# Patient Record
Sex: FEMALE | Race: BLACK OR AFRICAN AMERICAN | Employment: UNEMPLOYED | URBAN - METROPOLITAN AREA
[De-identification: names, ages, dates, MRNs, and addresses within clinical notes are randomized per-mention and may not be internally consistent; named-entity substitution may affect disease eponyms.]

---

## 2018-03-22 ENCOUNTER — HOSPITAL ENCOUNTER (EMERGENCY)
Age: 63
Discharge: HOME | End: 2018-03-23
Attending: EMERGENCY MEDICINE
Payer: SELF-PAY

## 2018-03-22 DIAGNOSIS — E86.0 DEHYDRATION: Primary | ICD-10-CM

## 2018-03-22 LAB
ALBUMIN SERPL-MCNC: 4.3 G/DL (ref 3.4–5)
ALBUMIN/GLOB SERPL: 0.9 {RATIO} (ref 0.8–1.7)
ALP SERPL-CCNC: 71 U/L (ref 45–117)
ALT SERPL-CCNC: 61 U/L (ref 13–56)
ANION GAP SERPL CALC-SCNC: 8 MMOL/L (ref 3–18)
AST SERPL-CCNC: 59 U/L (ref 15–37)
BASOPHILS # BLD: 0 K/UL (ref 0–0.06)
BASOPHILS NFR BLD: 0 % (ref 0–3)
BILIRUB SERPL-MCNC: 0.6 MG/DL (ref 0.2–1)
BUN SERPL-MCNC: 45 MG/DL (ref 7–18)
BUN/CREAT SERPL: 27 (ref 12–20)
CALCIUM SERPL-MCNC: 9.9 MG/DL (ref 8.5–10.1)
CHLORIDE SERPL-SCNC: 97 MMOL/L (ref 100–108)
CK MB CFR SERPL CALC: 2.6 % (ref 0–4)
CK MB SERPL-MCNC: 4.5 NG/ML (ref 5–25)
CK SERPL-CCNC: 172 U/L (ref 26–192)
CO2 SERPL-SCNC: 30 MMOL/L (ref 21–32)
CREAT SERPL-MCNC: 1.69 MG/DL (ref 0.6–1.3)
DIFFERENTIAL METHOD BLD: ABNORMAL
EOSINOPHIL # BLD: 0 K/UL (ref 0–0.4)
EOSINOPHIL NFR BLD: 0 % (ref 0–5)
ERYTHROCYTE [DISTWIDTH] IN BLOOD BY AUTOMATED COUNT: 12.7 % (ref 11.6–14.5)
GLOBULIN SER CALC-MCNC: 4.9 G/DL (ref 2–4)
GLUCOSE SERPL-MCNC: 74 MG/DL (ref 74–99)
HCT VFR BLD AUTO: 50.4 % (ref 35–45)
HGB BLD-MCNC: 16.7 G/DL (ref 12–16)
LIPASE SERPL-CCNC: 167 U/L (ref 73–393)
LYMPHOCYTES # BLD: 5.7 K/UL (ref 0.8–3.5)
LYMPHOCYTES NFR BLD: 45 % (ref 20–51)
MCH RBC QN AUTO: 31.7 PG (ref 24–34)
MCHC RBC AUTO-ENTMCNC: 33.1 G/DL (ref 31–37)
MCV RBC AUTO: 95.8 FL (ref 74–97)
MONOCYTES # BLD: 0.8 K/UL (ref 0–1)
MONOCYTES NFR BLD: 6 % (ref 2–9)
NEUTS SEG # BLD: 6.1 K/UL (ref 1.8–8)
NEUTS SEG NFR BLD: 49 % (ref 42–75)
PLATELET # BLD AUTO: 249 K/UL (ref 135–420)
PLATELET COMMENTS,PCOM: ABNORMAL
PMV BLD AUTO: 10.3 FL (ref 9.2–11.8)
POTASSIUM SERPL-SCNC: 3.1 MMOL/L (ref 3.5–5.5)
PROT SERPL-MCNC: 9.2 G/DL (ref 6.4–8.2)
RBC # BLD AUTO: 5.26 M/UL (ref 4.2–5.3)
RBC MORPH BLD: ABNORMAL
SODIUM SERPL-SCNC: 135 MMOL/L (ref 136–145)
TROPONIN I SERPL-MCNC: <0.02 NG/ML (ref 0–0.04)
WBC # BLD AUTO: 12.6 K/UL (ref 4.6–13.2)

## 2018-03-22 PROCEDURE — 82550 ASSAY OF CK (CPK): CPT | Performed by: EMERGENCY MEDICINE

## 2018-03-22 PROCEDURE — 85025 COMPLETE CBC W/AUTO DIFF WBC: CPT | Performed by: EMERGENCY MEDICINE

## 2018-03-22 PROCEDURE — 99284 EMERGENCY DEPT VISIT MOD MDM: CPT

## 2018-03-22 PROCEDURE — 84443 ASSAY THYROID STIM HORMONE: CPT | Performed by: EMERGENCY MEDICINE

## 2018-03-22 PROCEDURE — 83690 ASSAY OF LIPASE: CPT | Performed by: EMERGENCY MEDICINE

## 2018-03-22 PROCEDURE — 80053 COMPREHEN METABOLIC PANEL: CPT | Performed by: EMERGENCY MEDICINE

## 2018-03-22 NOTE — LETTER
[address] [place] [address] [facility] EMERGENCY DEPT 
[address] [facility] [address] 
[phone] Work/School Note Date: 3/22/2018 To Whom It May concern: 
 
Vangie Chairez was seen and treated today in the emergency room by the following provider(s): 
Attending Provider: Piero Del Real MD.   
 
Vangie Chairez may return to work on 3/24/18.  
 
Sincerely, 
 
 
 
 
Piero Del Real MD

## 2018-03-23 VITALS
RESPIRATION RATE: 16 BRPM | TEMPERATURE: 96.8 F | HEART RATE: 78 BPM | SYSTOLIC BLOOD PRESSURE: 143 MMHG | DIASTOLIC BLOOD PRESSURE: 51 MMHG | OXYGEN SATURATION: 98 %

## 2018-03-23 LAB — TSH SERPL DL<=0.05 MIU/L-ACNC: 6.32 UIU/ML (ref 0.36–3.74)

## 2018-03-23 PROCEDURE — 74011250636 HC RX REV CODE- 250/636: Performed by: EMERGENCY MEDICINE

## 2018-03-23 PROCEDURE — 96361 HYDRATE IV INFUSION ADD-ON: CPT

## 2018-03-23 PROCEDURE — 96360 HYDRATION IV INFUSION INIT: CPT

## 2018-03-23 RX ADMIN — SODIUM CHLORIDE 1000 ML: 900 INJECTION, SOLUTION INTRAVENOUS at 00:50

## 2018-03-23 NOTE — ED TRIAGE NOTES
Pt states she was vomiting and having diarrhea Monday and Tuesday of this week. States no longer having diarrhea or vomiting by reports now that she is fatigued.

## 2018-03-23 NOTE — ED PROVIDER NOTES
EMERGENCY DEPARTMENT HISTORY AND PHYSICAL EXAM    10:18 PM      Date: 3/22/2018  Patient Name: Chuck Renee    History of Presenting Illness     Chief Complaint   Patient presents with    Fatigue         History Provided By: Patient    Chief Complaint: Fatigue   Duration:  Hours  Timing:  Constant  Location: Constitutional  Severity: N/A  Modifying Factors: No alleviating or exacerbating factors reported  Associated Symptoms: abd pain and n/v/d (now resolved) and lack of appetite       Additional History (Context):     Chuck Renee is a 61 y.o. female with a pertinent history of HTN, presenting to the ED c/o constant fatigue today. Also reports lack of appetite. Pt explains she woke up with n/v/d and abd pain 4 days ago. sxs continued and resolved 2 days ago, now pt states \"I just feel weak. \" Says she had 4 episodes of vomiting and 4 episodes of diarrhea (described as \"runny stools\"). States she has not been tolerating PO solids well but has been able to drink Ginger-Sejal. Notes her grandchild had \"cold\" sxs. Pt denies dysuria, urinary frequency, CP, SOB. No current nausea or abd pain. No other acute symptoms or complaints were noted. PCP: None    Current Outpatient Prescriptions   Medication Sig Dispense Refill    HYDROcodone-acetaminophen (NORCO) 5-325 mg per tablet 1 to 2 tabs every 4-6 hours prn 20 Tab 0    olmesartan-hydrochlorothiazide (BENICAR HCT) 20-12.5 mg per tablet Take 1 Tab by mouth daily. 30 Tab 0    fenofibrate nanocrystallized (TRICOR) 48 mg tablet Take 1 Tab by mouth daily. 30 Tab 0       Past History     Past Medical History:  Past Medical History:   Diagnosis Date    Hypertension        Past Surgical History:  History reviewed. No pertinent surgical history.     Family History:  Family History   Problem Relation Age of Onset    Cancer Mother     Heart Disease Father     Hypertension Sister        Social History:  Social History   Substance Use Topics    Smoking status: Current Every Day Smoker     Packs/day: 0.30     Types: Cigarettes    Smokeless tobacco: Never Used    Alcohol use Yes       Allergies:  No Known Allergies      Review of Systems       Review of Systems   Constitutional: Positive for appetite change and fatigue. Negative for chills and fever. HENT: Negative for rhinorrhea. Respiratory: Negative for shortness of breath. Cardiovascular: Negative for chest pain. Gastrointestinal: Positive for abdominal pain (now resolved), diarrhea (now resolved), nausea (now resolved) and vomiting (now resolved ). Endocrine: Negative for polyuria. Genitourinary: Negative for dysuria and frequency. Musculoskeletal: Negative for back pain. Skin: Negative for rash. Neurological: Negative for headaches. All other systems reviewed and are negative. Physical Exam     Patient Vitals for the past 12 hrs:   Temp Pulse Resp BP SpO2   03/22/18 2200 - - - - 95 %   03/22/18 2050 96.8 °F (36 °C) 78 16 120/69 95 %         Physical Exam   Constitutional: She is oriented to person, place, and time. She appears well-developed and well-nourished. Speaking in full sentences   HENT:   Head: Normocephalic and atraumatic. Eyes: Conjunctivae are normal. Pupils are equal, round, and reactive to light. Neck: Normal range of motion. Neck supple. Cardiovascular: Normal rate and regular rhythm. Pulmonary/Chest: Effort normal and breath sounds normal. No respiratory distress. She has no wheezes. Abdominal: Soft. Bowel sounds are normal. She exhibits no distension. There is no tenderness. There is no rebound and no guarding. Musculoskeletal: Normal range of motion. Neurological: She is alert and oriented to person, place, and time. Skin: Skin is warm and dry. Psychiatric: She has a normal mood and affect. Thought content normal.   Nursing note and vitals reviewed.         Diagnostic Study Results     Labs -  Recent Results (from the past 12 hour(s))   CBC WITH AUTOMATED DIFF Collection Time: 03/22/18 10:28 PM   Result Value Ref Range    WBC 12.6 4.6 - 13.2 K/uL    RBC 5.26 4.20 - 5.30 M/uL    HGB 16.7 (H) 12.0 - 16.0 g/dL    HCT 50.4 (H) 35.0 - 45.0 %    MCV 95.8 74.0 - 97.0 FL    MCH 31.7 24.0 - 34.0 PG    MCHC 33.1 31.0 - 37.0 g/dL    RDW 12.7 11.6 - 14.5 %    PLATELET 307 130 - 773 K/uL    MPV 10.3 9.2 - 11.8 FL    NEUTROPHILS 49 42 - 75 %    LYMPHOCYTES 45 20 - 51 %    MONOCYTES 6 2 - 9 %    EOSINOPHILS 0 0 - 5 %    BASOPHILS 0 0 - 3 %    ABS. NEUTROPHILS 6.1 1.8 - 8.0 K/UL    ABS. LYMPHOCYTES 5.7 (H) 0.8 - 3.5 K/UL    ABS. MONOCYTES 0.8 0 - 1.0 K/UL    ABS. EOSINOPHILS 0.0 0.0 - 0.4 K/UL    ABS. BASOPHILS 0.0 0.0 - 0.06 K/UL    DF MANUAL      PLATELET COMMENTS ADEQUATE PLATELETS      RBC COMMENTS NORMOCYTIC, NORMOCHROMIC     METABOLIC PANEL, COMPREHENSIVE    Collection Time: 03/22/18 10:28 PM   Result Value Ref Range    Sodium 135 (L) 136 - 145 mmol/L    Potassium 3.1 (L) 3.5 - 5.5 mmol/L    Chloride 97 (L) 100 - 108 mmol/L    CO2 30 21 - 32 mmol/L    Anion gap 8 3.0 - 18 mmol/L    Glucose 74 74 - 99 mg/dL    BUN 45 (H) 7.0 - 18 MG/DL    Creatinine 1.69 (H) 0.6 - 1.3 MG/DL    BUN/Creatinine ratio 27 (H) 12 - 20      GFR est AA 37 (L) >60 ml/min/1.73m2    GFR est non-AA 31 (L) >60 ml/min/1.73m2    Calcium 9.9 8.5 - 10.1 MG/DL    Bilirubin, total 0.6 0.2 - 1.0 MG/DL    ALT (SGPT) 61 (H) 13 - 56 U/L    AST (SGOT) 59 (H) 15 - 37 U/L    Alk.  phosphatase 71 45 - 117 U/L    Protein, total 9.2 (H) 6.4 - 8.2 g/dL    Albumin 4.3 3.4 - 5.0 g/dL    Globulin 4.9 (H) 2.0 - 4.0 g/dL    A-G Ratio 0.9 0.8 - 1.7     LIPASE    Collection Time: 03/22/18 10:28 PM   Result Value Ref Range    Lipase 167 73 - 393 U/L   CARDIAC PANEL,(CK, CKMB & TROPONIN)    Collection Time: 03/22/18 10:28 PM   Result Value Ref Range     26 - 192 U/L    CK - MB 4.5 (H) <3.6 ng/ml    CK-MB Index 2.6 0.0 - 4.0 %    Troponin-I, Qt. <0.02 0.0 - 0.045 NG/ML   TSH 3RD GENERATION    Collection Time: 03/22/18 10:28 PM Result Value Ref Range    TSH 6.32 (H) 0.36 - 3.74 uIU/mL       Radiologic Studies -   No results found. Medical Decision Making   I am the first provider for this patient. I reviewed the vital signs, available nursing notes, past medical history, past surgical history, family history and social history. Vital Signs-Reviewed the patient's vital signs. Pulse Oximetry Analysis -  95% on room air (Interpretation)    Records Reviewed: Nursing Notes and Old Medical Records (Time of Review: 10:18 PM)    Provider Notes (Medical Decision Making): Patient with mild dehydration after likely viral illness. Has no abdominal tenderness. Mild AMELIA and dehydration on labs. Says she feels much better after 1L IVF. Says she will return if has any new symptoms. Did not want any zofran for home. ED Course: Progress Notes, Reevaluation, and Consults:    2:04 AM Ambulated the pt. Pt states she feels beter. No current complaints. Diagnosis     Clinical Impression:   1. Dehydration        Disposition: Discharged     Follow-up Information     Follow up With Details Comments Contact Info    [facility] EMERGENCY [facility]PT  If symptoms worsen [address] [place] [id number]  [phone]           Patient's Medications   Start Taking    No medications on file   Continue Taking    FENOFIBRATE NANOCRYSTALLIZED (TRICOR) 48 MG TABLET    Take 1 Tab by mouth daily. HYDROCODONE-ACETAMINOPHEN (NORCO) 5-325 MG PER TABLET    1 to 2 tabs every 4-6 hours prn    OLMESARTAN-HYDROCHLOROTHIAZIDE (BENICAR HCT) 20-12.5 MG PER TABLET    Take 1 Tab by mouth daily.    These Medications have changed    No medications on file   Stop Taking    No medications on file     _______________________________    Attestations:  Scribe Attestation     Joya Fitzpatrick acting as a scribe for and in the presence of Slava Avina MD     March 22, 2018 at 10:18 PM       Provider Attestation:      I personally performed the services described in the documentation, reviewed the documentation, as recorded by the scribe in my presence, and it accurately and completely records my words and actions.  March 22, 2018 at 10:18 PM - Jena Ojeda MD    _______________________________

## 2018-03-23 NOTE — DISCHARGE INSTRUCTIONS
Dehydration: Care Instructions  Your Care Instructions  Dehydration happens when your body loses too much fluid. This might happen when you do not drink enough water or you lose large amounts of fluids from your body because of diarrhea, vomiting, or sweating. Severe dehydration can be life-threatening. Water and minerals called electrolytes help put your body fluids back in balance. Learn the early signs of fluid loss, and drink more fluids to prevent dehydration. Follow-up care is a key part of your treatment and safety. Be sure to make and go to all appointments, and call your doctor if you are having problems. It's also a good idea to know your test results and keep a list of the medicines you take. How can you care for yourself at home? · To prevent dehydration, drink plenty of fluids, enough so that your urine is light yellow or clear like water. Choose water and other caffeine-free clear liquids until you feel better. If you have kidney, heart, or liver disease and have to limit fluids, talk with your doctor before you increase the amount of fluids you drink. · If you do not feel like eating or drinking, try taking small sips of water, sports drinks, or other rehydration drinks. · Get plenty of rest.  To prevent dehydration  · Add more fluids to your diet and daily routine, unless your doctor has told you not to. · During hot weather, drink more fluids. Drink even more fluids if you exercise a lot. Stay away from drinks with alcohol or caffeine. · Watch for the symptoms of dehydration. These include:  ¨ A dry, sticky mouth. ¨ Dark yellow urine, and not much of it. ¨ Dry and sunken eyes. ¨ Feeling very tired. · Learn what problems can lead to dehydration. These include:  ¨ Diarrhea, fever, and vomiting. ¨ Any illness with a fever, such as pneumonia or the flu. ¨ Activities that cause heavy sweating, such as endurance races and heavy outdoor work in hot or humid weather.   ¨ Alcohol or drug abuse or withdrawal.  ¨ Certain medicines, such as cold and allergy pills (antihistamines), diet pills (diuretics), and laxatives. ¨ Certain diseases, such as diabetes, cancer, and heart or kidney disease. When should you call for help? Call 911 anytime you think you may need emergency care. For example, call if:  ? · You passed out (lost consciousness). ?Call your doctor now or seek immediate medical care if:  ? · You are confused and cannot think clearly. ? · You are dizzy or lightheaded, or you feel like you may faint. ? · You have signs of needing more fluids. You have sunken eyes and a dry mouth, and you pass only a little dark urine. ? · You cannot keep fluids down. ? Watch closely for changes in your health, and be sure to contact your doctor if:  ? · You are not making tears. ? · Your skin is very dry and sags slowly back into place after you pinch it. ? · Your mouth and eyes are very dry. Where can you learn more? Go to http://david-eyad.info/. Enter I996 in the search box to learn more about \"Dehydration: Care Instructions. \"  Current as of: March 20, 2017  Content Version: 11.4  © 9359-5006 [employer]. Care instructions adapted under license by [employer] (which disclaims liability or warranty for this information). If you have questions about a medical condition or this instruction, always ask your healthcare professional. [place] [address] any warranty or liability for your use of this information.

## 2020-11-26 ENCOUNTER — APPOINTMENT (OUTPATIENT)
Dept: GENERAL RADIOLOGY | Age: 65
End: 2020-11-26
Attending: EMERGENCY MEDICINE
Payer: MEDICARE

## 2020-11-26 ENCOUNTER — HOSPITAL ENCOUNTER (EMERGENCY)
Age: 65
Discharge: HOME | End: 2020-11-26
Attending: EMERGENCY MEDICINE
Payer: MEDICARE

## 2020-11-26 VITALS
SYSTOLIC BLOOD PRESSURE: 113 MMHG | HEART RATE: 83 BPM | RESPIRATION RATE: 18 BRPM | HEIGHT: 60 IN | DIASTOLIC BLOOD PRESSURE: 80 MMHG | OXYGEN SATURATION: 99 % | BODY MASS INDEX: 22.97 KG/M2 | WEIGHT: 117 LBS | TEMPERATURE: 98.1 F

## 2020-11-26 DIAGNOSIS — I10 HYPERTENSION, UNSPECIFIED TYPE: ICD-10-CM

## 2020-11-26 DIAGNOSIS — R11.2 NAUSEA AND VOMITING, INTRACTABILITY OF VOMITING NOT SPECIFIED, UNSPECIFIED VOMITING TYPE: Primary | ICD-10-CM

## 2020-11-26 DIAGNOSIS — N39.0 URINARY TRACT INFECTION WITHOUT HEMATURIA, SITE UNSPECIFIED: ICD-10-CM

## 2020-11-26 DIAGNOSIS — E87.6 HYPOKALEMIA: ICD-10-CM

## 2020-11-26 LAB
ANION GAP SERPL CALC-SCNC: 11 MMOL/L (ref 3–18)
APPEARANCE UR: ABNORMAL
BACTERIA URNS QL MICRO: ABNORMAL /HPF
BASOPHILS # BLD: 0 K/UL (ref 0–0.06)
BASOPHILS NFR BLD: 0 % (ref 0–3)
BILIRUB UR QL: ABNORMAL
BUN SERPL-MCNC: 21 MG/DL (ref 7–18)
BUN/CREAT SERPL: 19 (ref 12–20)
CALCIUM SERPL-MCNC: 11.3 MG/DL (ref 8.5–10.1)
CHLORIDE SERPL-SCNC: 98 MMOL/L (ref 100–111)
CO2 SERPL-SCNC: 29 MMOL/L (ref 21–32)
COLOR UR: ABNORMAL
CREAT SERPL-MCNC: 1.12 MG/DL (ref 0.6–1.3)
DIFFERENTIAL METHOD BLD: ABNORMAL
EOSINOPHIL # BLD: 0 K/UL (ref 0–0.4)
EOSINOPHIL NFR BLD: 0 % (ref 0–5)
EPITH CASTS URNS QL MICRO: ABNORMAL /LPF (ref 0–5)
ERYTHROCYTE [DISTWIDTH] IN BLOOD BY AUTOMATED COUNT: 12.7 % (ref 11.6–14.5)
GLUCOSE SERPL-MCNC: 117 MG/DL (ref 74–99)
GLUCOSE UR STRIP.AUTO-MCNC: NEGATIVE MG/DL
HCT VFR BLD AUTO: 53.7 % (ref 35–45)
HGB BLD-MCNC: 18 G/DL (ref 12–16)
HGB UR QL STRIP: ABNORMAL
KETONES UR QL STRIP.AUTO: 15 MG/DL
LEUKOCYTE ESTERASE UR QL STRIP.AUTO: ABNORMAL
LYMPHOCYTES # BLD: 2.4 K/UL (ref 0.8–3.5)
LYMPHOCYTES NFR BLD: 16 % (ref 20–51)
MCH RBC QN AUTO: 31.9 PG (ref 24–34)
MCHC RBC AUTO-ENTMCNC: 33.5 G/DL (ref 31–37)
MCV RBC AUTO: 95.2 FL (ref 74–97)
MONOCYTES # BLD: 1.1 K/UL (ref 0–1)
MONOCYTES NFR BLD: 7 % (ref 2–9)
MUCOUS THREADS URNS QL MICRO: ABNORMAL /LPF
NEUTS SEG # BLD: 11.5 K/UL (ref 1.8–8)
NEUTS SEG NFR BLD: 77 % (ref 42–75)
NITRITE UR QL STRIP.AUTO: NEGATIVE
PH UR STRIP: 5.5 [PH] (ref 5–8)
PLATELET # BLD AUTO: 311 K/UL (ref 135–420)
PLATELET COMMENTS,PCOM: ABNORMAL
PMV BLD AUTO: 10.2 FL (ref 9.2–11.8)
POTASSIUM SERPL-SCNC: 3.2 MMOL/L (ref 3.5–5.5)
PROT UR STRIP-MCNC: 300 MG/DL
RBC # BLD AUTO: 5.64 M/UL (ref 4.2–5.3)
RBC #/AREA URNS HPF: ABNORMAL /HPF (ref 0–5)
RBC MORPH BLD: ABNORMAL
SODIUM SERPL-SCNC: 138 MMOL/L (ref 136–145)
SP GR UR REFRACTOMETRY: >1.03 (ref 1–1.03)
UROBILINOGEN UR QL STRIP.AUTO: 1 EU/DL (ref 0.2–1)
WBC # BLD AUTO: 15 K/UL (ref 4.6–13.2)
WBC URNS QL MICRO: ABNORMAL /HPF (ref 0–4)

## 2020-11-26 PROCEDURE — 99285 EMERGENCY DEPT VISIT HI MDM: CPT

## 2020-11-26 PROCEDURE — 80048 BASIC METABOLIC PNL TOTAL CA: CPT

## 2020-11-26 PROCEDURE — 85025 COMPLETE CBC W/AUTO DIFF WBC: CPT

## 2020-11-26 PROCEDURE — 81001 URINALYSIS AUTO W/SCOPE: CPT

## 2020-11-26 PROCEDURE — 71045 X-RAY EXAM CHEST 1 VIEW: CPT

## 2020-11-26 PROCEDURE — 93005 ELECTROCARDIOGRAM TRACING: CPT

## 2020-11-26 PROCEDURE — 74011250636 HC RX REV CODE- 250/636: Performed by: EMERGENCY MEDICINE

## 2020-11-26 PROCEDURE — 74011250637 HC RX REV CODE- 250/637: Performed by: EMERGENCY MEDICINE

## 2020-11-26 PROCEDURE — 96374 THER/PROPH/DIAG INJ IV PUSH: CPT

## 2020-11-26 PROCEDURE — 87635 SARS-COV-2 COVID-19 AMP PRB: CPT

## 2020-11-26 PROCEDURE — 87086 URINE CULTURE/COLONY COUNT: CPT

## 2020-11-26 PROCEDURE — 96375 TX/PRO/DX INJ NEW DRUG ADDON: CPT

## 2020-11-26 RX ORDER — LABETALOL HCL 20 MG/4 ML
20 SYRINGE (ML) INTRAVENOUS ONCE
Status: COMPLETED | OUTPATIENT
Start: 2020-11-26 | End: 2020-11-26

## 2020-11-26 RX ORDER — CEFDINIR 300 MG/1
300 CAPSULE ORAL 2 TIMES DAILY
Qty: 20 CAP | Refills: 0 | Status: SHIPPED | OUTPATIENT
Start: 2020-11-26 | End: 2020-12-06

## 2020-11-26 RX ORDER — ONDANSETRON 4 MG/1
4 TABLET, FILM COATED ORAL
Qty: 12 TAB | Refills: 0 | Status: SHIPPED | OUTPATIENT
Start: 2020-11-26 | End: 2021-07-16 | Stop reason: SDUPTHER

## 2020-11-26 RX ORDER — AMLODIPINE BESYLATE 5 MG/1
5 TABLET ORAL DAILY
Qty: 20 TAB | Refills: 0 | Status: SHIPPED | OUTPATIENT
Start: 2020-11-26 | End: 2020-12-16

## 2020-11-26 RX ORDER — ONDANSETRON 2 MG/ML
4 INJECTION INTRAMUSCULAR; INTRAVENOUS
Status: COMPLETED | OUTPATIENT
Start: 2020-11-26 | End: 2020-11-26

## 2020-11-26 RX ADMIN — POTASSIUM BICARBONATE 20 MEQ: 782 TABLET, EFFERVESCENT ORAL at 19:36

## 2020-11-26 RX ADMIN — LABETALOL HYDROCHLORIDE 20 MG: 5 INJECTION, SOLUTION INTRAVENOUS at 17:09

## 2020-11-26 RX ADMIN — ONDANSETRON 4 MG: 2 INJECTION INTRAMUSCULAR; INTRAVENOUS at 17:09

## 2020-11-26 NOTE — ED TRIAGE NOTES
Patient advised by [facility] to report to ER for further evaluation related to dx of Hypertensive Emergency. Patient c/o fatigue, vomiting, loss of appetite and nausea x 4 days.

## 2020-11-26 NOTE — ED TRIAGE NOTES
Patient states that she has not used BP medications in approximately one year. She states last smoking cigarettes approximately 8 months ago. States last using heroin approximately 6 months ago.

## 2020-11-27 ENCOUNTER — PATIENT OUTREACH (OUTPATIENT)
Dept: CASE MANAGEMENT | Age: 65
End: 2020-11-27

## 2020-11-27 LAB
ATRIAL RATE: 104 BPM
CALCULATED P AXIS, ECG09: 81 DEGREES
CALCULATED R AXIS, ECG10: 38 DEGREES
CALCULATED T AXIS, ECG11: 78 DEGREES
DIAGNOSIS, 93000: NORMAL
P-R INTERVAL, ECG05: 136 MS
Q-T INTERVAL, ECG07: 376 MS
QRS DURATION, ECG06: 74 MS
QTC CALCULATION (BEZET), ECG08: 494 MS
VENTRICULAR RATE, ECG03: 104 BPM

## 2020-11-27 NOTE — ED PROVIDER NOTES
EMERGENCY DEPARTMENT HISTORY AND PHYSICAL EXAM    7:30 PM  Date: 2020  Patient Name: Iron Miller    History of Presenting Illness     Chief Complaint   Patient presents with    Hypertension    Anorexia    Fatigue    Nausea        History Provided By: patient     HPI: Iron Miller is a 72 y.o. female with past medical history of hypertension presents with cold symptoms for 4 days, patient had some episodes of vomiting, and feels queasy. Patient denies any abdominal pain. Patient went to an  urgent care and her blood pressure was elevated sent here. Patient denies any chest pain, shortness of breath, fever or chills. Has not been taking antihypertensive medicines for a year. Last time she used heroin was 6 months ago. PCP: None    Past History     Past Medical History:  Past Medical History:   Diagnosis Date    Heroin abuse ([facility] [address]     Hypertension        Past Surgical History:  History reviewed. No pertinent surgical history. Family History:  Family History   Problem Relation Age of Onset    Cancer Mother     Heart Disease Father     Hypertension Sister        Social History:  Social History     Tobacco Use    Smoking status: Former Smoker     Packs/day: 0.30     Types: Cigarettes     Last attempt to quit: 3/26/2020     Years since quittin.6    Smokeless tobacco: Never Used   Substance Use Topics    Alcohol use: Yes     Comment: occasional    Drug use: Not Currently     Types: Heroin     Comment: taking methadone stop Heroin about 8 years ago       Allergies:  No Known Allergies    Review of Systems   Review of Systems   Constitutional: Negative for activity change, appetite change and chills. HENT: Negative for congestion, ear discharge, ear pain and sore throat. Eyes: Negative for photophobia and pain. Respiratory: Negative for cough and choking. Cardiovascular: Negative for palpitations and leg swelling. Gastrointestinal: Positive for nausea and vomiting.  Negative for anal bleeding and rectal pain. Endocrine: Negative for polydipsia and polyuria. Genitourinary: Negative for genital sores and urgency. Musculoskeletal: Negative for arthralgias and myalgias. Neurological: Negative for dizziness, seizures and speech difficulty. Psychiatric/Behavioral: Negative for hallucinations, self-injury and suicidal ideas. Physical Exam     Patient Vitals for the past 12 hrs:   Temp Pulse Resp BP SpO2   11/26/20 1922 98.1 °F (36.7 °C) 83 18 113/80 99 %   11/26/20 1830    (!) 177/65 97 %   11/26/20 1815    (!) 167/95 95 %   11/26/20 1800    (!) 201/93 98 %   11/26/20 1745    (!) 177/85 97 %   11/26/20 1730    (!) 165/88 94 %   11/26/20 1715    (!) 207/88 96 %   11/26/20 1706    (!) 210/101 95 %   11/26/20 1621 98 °F (36.7 °C) (!) 112 20 (!) 210/116 94 %       Physical Exam  Vitals signs and nursing note reviewed. Constitutional:       Appearance: She is well-developed. HENT:      Head: Normocephalic and atraumatic. Eyes:      General:         Right eye: No discharge. Left eye: No discharge. Neck:      Musculoskeletal: Normal range of motion and neck supple. Cardiovascular:      Rate and Rhythm: Normal rate and regular rhythm. Heart sounds: Normal heart sounds. No murmur. Pulmonary:      Effort: Pulmonary effort is normal. No respiratory distress. Breath sounds: Normal breath sounds. No stridor. No wheezing or rales. Chest:      Chest wall: No tenderness. Abdominal:      General: Bowel sounds are normal. There is no distension. Palpations: Abdomen is soft. Tenderness: There is no abdominal tenderness. There is no guarding or rebound. Musculoskeletal: Normal range of motion. Skin:     General: Skin is warm and dry. Neurological:      Mental Status: She is alert and oriented to person, place, and time.          Diagnostic Study Results     Labs -  Recent Results (from the past 12 hour(s))   EKG, 12 LEAD, INITIAL    Collection Time: 11/26/20  5:00 PM   Result Value Ref Range    Ventricular Rate 104 BPM    Atrial Rate 104 BPM    P-R Interval 136 ms    QRS Duration 74 ms    Q-T Interval 376 ms    QTC Calculation (Bezet) 494 ms    Calculated P Axis 81 degrees    Calculated R Axis 38 degrees    Calculated T Axis 78 degrees    Diagnosis       Sinus tachycardia  Right atrial enlargement  Moderate voltage criteria for LVH, may be normal variant  Borderline ECG  When compared with ECG of 11-AUG-2013 15:58,  Nonspecific T wave abnormality now evident in Lateral leads     CBC WITH AUTOMATED DIFF    Collection Time: 11/26/20  5:21 PM   Result Value Ref Range    WBC 15.0 (H) 4.6 - 13.2 K/uL    RBC 5.64 (H) 4.20 - 5.30 M/uL    HGB 18.0 (H) 12.0 - 16.0 g/dL    HCT 53.7 (H) 35.0 - 45.0 %    MCV 95.2 74.0 - 97.0 FL    MCH 31.9 24.0 - 34.0 PG    MCHC 33.5 31.0 - 37.0 g/dL    RDW 12.7 11.6 - 14.5 %    PLATELET 273 368 - 369 K/uL    MPV 10.2 9.2 - 11.8 FL    NEUTROPHILS 77 (H) 42 - 75 %    LYMPHOCYTES 16 (L) 20 - 51 %    MONOCYTES 7 2 - 9 %    EOSINOPHILS 0 0 - 5 %    BASOPHILS 0 0 - 3 %    ABS. NEUTROPHILS 11.5 (H) 1.8 - 8.0 K/UL    ABS. LYMPHOCYTES 2.4 0.8 - 3.5 K/UL    ABS. MONOCYTES 1.1 (H) 0 - 1.0 K/UL    ABS. EOSINOPHILS 0.0 0.0 - 0.4 K/UL    ABS.  BASOPHILS 0.0 0.0 - 0.06 K/UL    DF MANUAL      PLATELET COMMENTS ADEQUATE PLATELETS      RBC COMMENTS NORMOCYTIC, NORMOCHROMIC     METABOLIC PANEL, BASIC    Collection Time: 11/26/20  5:21 PM   Result Value Ref Range    Sodium 138 136 - 145 mmol/L    Potassium 3.2 (L) 3.5 - 5.5 mmol/L    Chloride 98 (L) 100 - 111 mmol/L    CO2 29 21 - 32 mmol/L    Anion gap 11 3.0 - 18 mmol/L    Glucose 117 (H) 74 - 99 mg/dL    BUN 21 (H) 7.0 - 18 MG/DL    Creatinine 1.12 0.6 - 1.3 MG/DL    BUN/Creatinine ratio 19 12 - 20      GFR est AA 59 (L) >60 ml/min/1.73m2    GFR est non-AA 49 (L) >60 ml/min/1.73m2    Calcium 11.3 (H) 8.5 - 10.1 MG/DL   URINALYSIS W/ RFLX MICROSCOPIC    Collection Time: 11/26/20 7:23 PM   Result Value Ref Range    Color DARK YELLOW      Appearance CLOUDY      Specific gravity >1.030 (H) 1.005 - 1.030    pH (UA) 5.5 5.0 - 8.0      Protein 300 (A) NEG mg/dL    Glucose Negative NEG mg/dL    Ketone 15 (A) NEG mg/dL    Bilirubin LARGE (A) NEG      Blood MODERATE (A) NEG      Urobilinogen 1.0 0.2 - 1.0 EU/dL    Nitrites Negative NEG      Leukocyte Esterase SMALL (A) NEG     URINE MICROSCOPIC ONLY    Collection Time: 11/26/20  7:23 PM   Result Value Ref Range    WBC 4 to 10 0 - 4 /hpf    RBC 11 to 20 0 - 5 /hpf    Epithelial cells 2+ 0 - 5 /lpf    Bacteria 2+ (A) NEG /hpf    Mucus 2+ (A) NEG /lpf       Radiologic Studies -   Xr Chest Port    Result Date: 11/26/2020  IMPRESSION: No radiographic finding for an acute cardiopulmonary process. Medical Decision Making     ED Course: Progress Notes, Reevaluation, and Consults:    7:30 PM Initial assessment performed. The patients presenting problems have been discussed, and they/their family are in agreement with the care plan formulated and outlined with them. I have encouraged them to ask questions as they arise throughout their visit. Provider Notes (Medical Decision Making):   Patient presents with elevated blood pressure from urgent care  Systolic blood pressure in the 220s  Patient given labetalol  No ischemic changes on EKG  Patient has had cold-like symptoms Patient will be tested for COVID-19  Labs as reviewed by me hypokalemia-potassium will be repleted  Patient diagnosed with a UTI  Abdomen soft nontender  Patient has been noncompliant with her antihypertensive medication. Given prescription amlodipine. Advised follow-up with PMD for better blood pressure control  Advised to self quarantine till results are back        Vital Signs-Reviewed the patient's vital signs. Reviewed pt's pulse ox reading. EKG:   Interpreted by me   Rate: 104   Rhythm: NSR   Interpretation: no ST changes  Normal intervals    Records Reviewed:  old medical records (Time of Review: 7:30 PM)  -I am the first provider for this patient.  -I reviewed the vital signs, available nursing notes, past medical history, past surgical history, family history and social history. Clinical Impression     Clinical Impression:     Disposition: discharge      DISCHARGE NOTE:   Pt has been reexamined. Patient has no new complaints, changes, or physical findings. Care plan outlined and precautions discussed. Results were reviewed with the patient. All medications were reviewed with the patient; will d/c home with PMD f/u. All of pt's questions and concerns were addressed. Patient was instructed and agrees to follow up with PMD, as well as to return to the ED upon further deterioration. Patient is ready to go home. This note was dictated utilizing voice recognition software which may lead to typographical errors. I apologize in advance if the situation occurs. If questions arise please do not hesitate to contact me or call our department. This note was dictated utilizing voice recognition software which may lead to typographical errors. I apologize in advance if the situation occurs. If questions arise please do not hesitate to contact me or call our department.     Karley Santacruz MD  7:30 PM

## 2020-11-27 NOTE — PROGRESS NOTES
Date/Time:  11/27/2020 9:04 AM   Call within 2 business days of discharge: Yes   Attempted to reach patient by telephone. Left HIPPA compliant message requesting a return call. Will attempt to reach patient again. Covid test pending.

## 2020-11-27 NOTE — ED NOTES
I have reviewed discharge instructions with the patient. The patient verbalized understanding. Patient armband removed and given to patient to take home. Patient was informed of the privacy risks if armband lost or stolen  Current Discharge Medication List      START taking these medications    Details   amLODIPine (NORVASC) 5 mg tablet Take 1 Tab by mouth daily for 20 days. Qty: 20 Tab, Refills: 0      ondansetron hcl (Zofran) 4 mg tablet Take 1 Tab by mouth every eight (8) hours as needed for Nausea. Qty: 12 Tab, Refills: 0      cefdinir (OMNICEF) 300 mg capsule Take 1 Cap by mouth two (2) times a day for 10 days.   Qty: 20 Cap, Refills: 0

## 2020-11-27 NOTE — DISCHARGE INSTRUCTIONS
[facility] Guidance for Preventing the Spread of Coronavirus Disease 2019 (COVID-19) in Homes and Residential Communities  Prevention steps for:  People with confirmed or suspected COVID-19 (including persons under investigation) who do not need to be hospitalized  and  People with confirmed COVID-19 who were hospitalized and determined to be medically stable to go home  Your healthcare provider and public health staff will evaluate whether you can be cared for at home. If it is determined that you do not need to be hospitalized and can be isolated at home, you will be monitored by staff from your local or state health department. You should follow the prevention steps below until a healthcare provider or local or state health department says you can return to your normal activities. Stay home except to get medical care  You should restrict activities outside your home, except for getting medical care. Do not go to work, school, or public areas. Avoid using public transportation, ride-sharing, or taxis. Separate yourself from other people and animals in your home  People: As much as possible, you should stay in a specific room and away from other people in your home. Also, you should use a separate bathroom, if available. Animals: You should restrict contact with pets and other animals while you are sick with COVID-19, just like you would around other people. Although there have not been reports of pets or other animals becoming sick with COVID-19, it is still recommended that people sick with COVID-19 limit contact with animals until more information is known about the virus. When possible, have another member of your household care for your animals while you are sick. If you are sick with COVID-19, avoid contact with your pet, including petting, snuggling, being kissed or licked, and sharing food.  If you must care for your pet or be around animals while you are sick, wash your hands before and after you interact with pets and wear a facemask. Call ahead before visiting your doctor  If you have a medical appointment, call the healthcare provider and tell them that you have or may have COVID-19. This will help the healthcare provider's office take steps to keep other people from getting infected or exposed. Wear a facemask  You should wear a facemask when you are around other people (e.g., sharing a room or vehicle) or pets and before you enter a healthcare provider's office. If you are not able to wear a facemask (for example, because it causes trouble breathing), then people who live with you should not stay in the same room with you, or they should wear a facemask if they enter your room. Cover your coughs and sneezes  Cover your mouth and nose with a tissue when you cough or sneeze. Throw used tissues in a lined trash can; immediately wash your hands with soap and water for at least 20 seconds or clean your hands with an alcohol-based hand  that contains 60 to 95% alcohol, covering all surfaces of your hands and rubbing them together until they feel dry. Soap and water should be used preferentially if hands are visibly dirty. Clean your hands often  Wash your hands often with soap and water for at least 20 seconds or clean your hands with an alcohol-based hand  that contains 60 to 95% alcohol, covering all surfaces of your hands and rubbing them together until they feel dry. Soap and water should be used preferentially if hands are visibly dirty. Avoid touching your eyes, nose, and mouth with unwashed hands. Avoid sharing personal household items  You should not share dishes, drinking glasses, cups, eating utensils, towels, or bedding with other people or pets in your home. After using these items, they should be washed thoroughly with soap and water.   Clean all high-touch surfaces everyday  High touch surfaces include counters, tabletops, doorknobs, bathroom fixtures, toilets, phones, keyboards, tablets, and bedside tables. Also, clean any surfaces that may have blood, stool, or body fluids on them. Use a household cleaning spray or wipe, according to the label instructions. Labels contain instructions for safe and effective use of the cleaning product including precautions you should take when applying the product, such as wearing gloves and making sure you have good ventilation during use of the product. Monitor your symptoms  Seek prompt medical attention if your illness is worsening (e.g., difficulty breathing). Before seeking care, call your healthcare provider and tell them that you have, or are being evaluated for, COVID-19. Put on a facemask before you enter the facility. These steps will help the healthcare provider's office to keep other people in the office or waiting room from getting infected or exposed. Ask your healthcare provider to call the local or state health department. Persons who are placed under active monitoring or facilitated self-monitoring should follow instructions provided by their local health department or occupational health professionals, as appropriate. If you have a medical emergency and need to call 911, notify the dispatch personnel that you have, or are being evaluated for COVID-19. If possible, put on a facemask before emergency medical services arrive. Discontinuing home isolation  Patients with confirmed COVID-19 should remain under home isolation precautions until the risk of secondary transmission to others is thought to be low. The decision to discontinue home isolation precautions should be made on a case-by-case basis, in consultation with healthcare providers and state and local health departments.   Recommended precautions for household members, intimate partners, and caregivers in a nonhealthcare setting of  A patient with symptomatic laboratory-confirmed COVID-19  or  A patient under investigation  Household members, intimate partners, and caregivers in a nonhealthcare setting may have close contact2 with a person with symptomatic, laboratory-confirmed COVID-19 or a person under investigation. Close contacts should monitor their health; they should call their healthcare provider right away if they develop symptoms suggestive of COVID-19 (e.g., fever, cough, shortness of breath)   Close contacts should also follow these recommendations:  Make sure that you understand and can help the patient follow their healthcare provider's instructions for medication(s) and care. You should help the patient with basic needs in the home and provide support for getting groceries, prescriptions, and other personal needs. Monitor the patient's symptoms. If the patient is getting sicker, call his or her healthcare provider and tell them that the patient has laboratory-confirmed COVID-19. This will help the healthcare provider's office take steps to keep other people in the office or waiting room from getting infected. Ask the healthcare provider to call the local or [facility] health department for additional guidance. If the patient has a medical emergency and you need to call 911, notify the dispatch personnel that the patient has, or is being evaluated for COVID-19. Household members should stay in another room or be  from the patient as much as possible. Household members should use a separate bedroom and bathroom, if available. Prohibit visitors who do not have an essential need to be in the home. Household members should care for any pets in the home. Do not handle pets or other animals while sick. Make sure that shared spaces in the home have good air flow, such as by an air conditioner or an opened window, weather permitting. Perform hand hygiene frequently.  Wash your hands often with soap and water for at least 20 seconds or use an alcohol-based hand  that contains 60 to 95% alcohol, covering all surfaces of your hands and rubbing them together until they feel dry. Soap and water should be used preferentially if hands are visibly dirty. Avoid touching your eyes, nose, and mouth with unwashed hands. You and the patient should wear a facemask if you are in the same room. Wear a disposable facemask and gloves when you touch or have contact with the patient's blood, stool, or body fluids, such as saliva, sputum, nasal mucus, vomit, urine. Throw out disposable facemasks and gloves after using them. Do not reuse. When removing personal protective equipment, first remove and dispose of gloves. Then, immediately clean your hands with soap and water or alcohol-based hand . Next, remove and dispose of facemask, and immediately clean your hands again with soap and water or alcohol-based hand . Avoid sharing household items with the patient. You should not share dishes, drinking glasses, cups, eating utensils, towels, bedding, or other items. After the patient uses these items, you should wash them thoroughly (see below [employer]). Clean all high-touch surfaces, such as counters, tabletops, doorknobs, bathroom fixtures, toilets, phones, keyboards, tablets, and bedside tables, every day. Also, clean any surfaces that may have blood, stool, or body fluids on them. Use a household cleaning spray or wipe, according to the label instructions. Labels contain instructions for safe and effective use of the cleaning product including precautions you should take when applying the product, such as wearing gloves and making sure you have good ventilation during use of the product. [address] thoroughly. Immediately remove and wash clothes or bedding that have blood, stool, or body fluids on them. Wear disposable gloves while handling soiled items and keep soiled items away from your body. Clean your hands (with soap and water or an alcohol-based hand ) immediately after removing your gloves.   Read and follow directions on labels of laundry or clothing items and detergent. In general, using a normal laundry detergent according to washing machine instructions and dry thoroughly using the warmest temperatures recommended on the clothing label. Place all used disposable gloves, facemasks, and other contaminated items in a lined container before disposing of them with other household waste. Clean your hands (with soap and water or an alcohol-based hand ) immediately after handling these items. Soap and water should be used preferentially if hands are visibly dirty. Discuss any additional questions with your state or local health department or healthcare provider. Footnotes  2Close contact is defined as--  a) being within approximately 6 feet (2 meters) of a COVID-19 case for a prolonged period of time; close contact can occur while caring for, living with, visiting, or sharing a health care waiting area or room with a COVID-19 case  - or -  b) having direct contact with infectious secretions of a COVID-19 case (e.g., being coughed on).   Page last reviewed: February 18, 2020   Content source: [facility] Division of Viral Diseases

## 2020-11-28 LAB
BACTERIA SPEC CULT: NORMAL
SERVICE CMNT-IMP: NORMAL

## 2020-11-29 LAB — SARS-COV-2, COV2NT: NOT DETECTED

## 2020-11-30 ENCOUNTER — PATIENT OUTREACH (OUTPATIENT)
Dept: CASE MANAGEMENT | Age: 65
End: 2020-11-30

## 2020-11-30 NOTE — PROGRESS NOTES
Date/Time:  11/30/2020 1:52 PM   Call within 2 business days of discharge: Yes   2nd attempt to reach patient by telephone. Left HIPPA compliant message requesting a return call. This episode is resolved. Covid test negative.

## 2021-01-07 ENCOUNTER — TRANSCRIBE ORDER (OUTPATIENT)
Dept: REGISTRATION | Age: 66
End: 2021-01-07

## 2021-01-07 ENCOUNTER — HOSPITAL ENCOUNTER (OUTPATIENT)
Dept: LAB | Age: 66
Discharge: HOME | End: 2021-01-07

## 2021-01-07 DIAGNOSIS — Z00.01 ENCOUNTER FOR GENERAL ADULT MEDICAL EXAMINATION WITH ABNORMAL FINDINGS: Primary | ICD-10-CM

## 2021-01-07 LAB — XX-LABCORP SPECIMEN COL,LCBCF: NORMAL

## 2021-01-07 PROCEDURE — 99001 SPECIMEN HANDLING PT-LAB: CPT

## 2021-03-15 ENCOUNTER — TRANSCRIBE ORDER (OUTPATIENT)
Dept: SCHEDULING | Age: 66
End: 2021-03-15

## 2021-03-15 DIAGNOSIS — Z12.31 VISIT FOR SCREENING MAMMOGRAM: Primary | ICD-10-CM

## 2021-07-16 ENCOUNTER — HOSPITAL ENCOUNTER (EMERGENCY)
Age: 66
Discharge: HOME | End: 2021-07-16
Attending: EMERGENCY MEDICINE
Payer: MEDICARE

## 2021-07-16 VITALS
OXYGEN SATURATION: 98 % | RESPIRATION RATE: 16 BRPM | DIASTOLIC BLOOD PRESSURE: 63 MMHG | TEMPERATURE: 98.2 F | SYSTOLIC BLOOD PRESSURE: 195 MMHG | HEART RATE: 63 BPM

## 2021-07-16 DIAGNOSIS — N12 PYELONEPHRITIS: Primary | ICD-10-CM

## 2021-07-16 LAB
APPEARANCE UR: CLEAR
BACTERIA URNS QL MICRO: NEGATIVE /HPF
BILIRUB UR QL: NEGATIVE
COLOR UR: YELLOW
EPITH CASTS URNS QL MICRO: NORMAL /LPF (ref 0–5)
GLUCOSE UR STRIP.AUTO-MCNC: NEGATIVE MG/DL
HGB UR QL STRIP: ABNORMAL
KETONES UR QL STRIP.AUTO: NEGATIVE MG/DL
LEUKOCYTE ESTERASE UR QL STRIP.AUTO: ABNORMAL
NITRITE UR QL STRIP.AUTO: NEGATIVE
PH UR STRIP: 6.5 [PH] (ref 5–8)
PROT UR STRIP-MCNC: ABNORMAL MG/DL
RBC #/AREA URNS HPF: NORMAL /HPF (ref 0–5)
SP GR UR REFRACTOMETRY: 1.01 (ref 1–1.03)
UROBILINOGEN UR QL STRIP.AUTO: 0.2 EU/DL (ref 0.2–1)
WBC URNS QL MICRO: NORMAL /HPF (ref 0–4)

## 2021-07-16 PROCEDURE — 99284 EMERGENCY DEPT VISIT MOD MDM: CPT

## 2021-07-16 PROCEDURE — 87086 URINE CULTURE/COLONY COUNT: CPT

## 2021-07-16 PROCEDURE — 81001 URINALYSIS AUTO W/SCOPE: CPT

## 2021-07-16 PROCEDURE — 74011250637 HC RX REV CODE- 250/637: Performed by: EMERGENCY MEDICINE

## 2021-07-16 RX ORDER — ONDANSETRON 4 MG/1
4 TABLET, FILM COATED ORAL
Qty: 12 TABLET | Refills: 0 | Status: SHIPPED | OUTPATIENT
Start: 2021-07-16 | End: 2021-08-12

## 2021-07-16 RX ORDER — ONDANSETRON 8 MG/1
4 TABLET, ORALLY DISINTEGRATING ORAL
Status: COMPLETED | OUTPATIENT
Start: 2021-07-16 | End: 2021-07-16

## 2021-07-16 RX ORDER — CEPHALEXIN 250 MG/1
500 CAPSULE ORAL
Status: COMPLETED | OUTPATIENT
Start: 2021-07-16 | End: 2021-07-16

## 2021-07-16 RX ORDER — CEPHALEXIN 500 MG/1
500 CAPSULE ORAL 4 TIMES DAILY
Qty: 28 CAPSULE | Refills: 0 | Status: SHIPPED | OUTPATIENT
Start: 2021-07-16 | End: 2021-07-23

## 2021-07-16 RX ADMIN — CEPHALEXIN 500 MG: 250 CAPSULE ORAL at 07:57

## 2021-07-16 RX ADMIN — ONDANSETRON 4 MG: 8 TABLET, ORALLY DISINTEGRATING ORAL at 07:57

## 2021-07-16 NOTE — ED PROVIDER NOTES
EMERGENCY DEPARTMENT HISTORY AND PHYSICAL EXAM      Date: 2021  Patient Name: Ezequiel Homans    History of Presenting Illness     Chief Complaint   Patient presents with    Abdominal Pain    Urinary Pain       History Provided By: Patient    Chief Complaint: Dysuria and suprapubic abdominal pain    Additional History (Context): Ezequiel Homans is a 77 y.o. female who presents with 2 to 3 days of dysuria and 24 hours of abdominal pain that is suprapubic, cramping and aching, mild to moderate intensity, radiates towards the left lower quadrant and left flank, constant, has not had specific pain like this in the past, no specific exacerbating or alleviating factors. Denies any fevers, chills, sweats. Has nausea but has not vomited. PCP: None    Current Facility-Administered Medications   Medication Dose Route Frequency Provider Last Rate Last Admin    cephALEXin (KEFLEX) capsule 500 mg  500 mg Oral NOW Driss John MD        ondansetron (ZOFRAN ODT) tablet 4 mg  4 mg Oral NOW Driss John MD         Current Outpatient Medications   Medication Sig Dispense Refill    ondansetron hcl (Zofran) 4 mg tablet Take 1 Tablet by mouth every eight (8) hours as needed for Nausea. 12 Tablet 0    cephALEXin (KEFLEX) 500 mg capsule Take 1 Capsule by mouth four (4) times daily for 7 days. 28 Capsule 0       Past History     Past Medical History:  Past Medical History:   Diagnosis Date    Heroin abuse ([facility] [address]     Hypertension        Past Surgical History:  No past surgical history on file.     Family History:  Family History   Problem Relation Age of Onset    Cancer Mother     Heart Disease Father     Hypertension Sister        Social History:  Social History     Tobacco Use    Smoking status: Former Smoker     Packs/day: 0.30     Types: Cigarettes     Quit date: 3/26/2020     Years since quittin.3    Smokeless tobacco: Never Used   Substance Use Topics    Alcohol use: Yes     Comment: occasional    Drug use: Not Currently     Types: Heroin     Comment: taking methadone stop Heroin about 8 years ago       Allergies:  No Known Allergies      Review of Systems   Review of Systems   Constitutional: Negative for chills, fatigue and fever. HENT: Negative for congestion, rhinorrhea, sore throat and trouble swallowing. Eyes: Negative for discharge, redness and itching. Respiratory: Negative for cough, shortness of breath, wheezing and stridor. Cardiovascular: Negative for chest pain, palpitations and leg swelling. Gastrointestinal: Positive for abdominal pain. Negative for blood in stool, diarrhea, nausea and vomiting. Genitourinary: Positive for dysuria, flank pain and frequency. Negative for decreased urine volume, difficulty urinating, hematuria and urgency. Musculoskeletal: Negative for back pain, myalgias, neck pain and neck stiffness. Skin: Negative for rash. Neurological: Negative for syncope and light-headedness. Psychiatric/Behavioral: Negative for behavioral problems, confusion, self-injury, sleep disturbance and suicidal ideas. All other systems reviewed and are negative. Physical Exam   There were no vitals filed for this visit. Physical Exam  Vitals and nursing note reviewed. Constitutional:       General: She is not in acute distress. Appearance: She is well-developed and normal weight. She is not diaphoretic. HENT:      Head: Normocephalic and atraumatic. Mouth/Throat:      Mouth: Mucous membranes are moist.      Pharynx: Oropharynx is clear. Eyes:      Extraocular Movements: Extraocular movements intact. Pupils: Pupils are equal, round, and reactive to light. Cardiovascular:      Rate and Rhythm: Normal rate and regular rhythm. Heart sounds: Normal heart sounds. No murmur heard. Pulmonary:      Effort: Pulmonary effort is normal.      Breath sounds: Normal breath sounds. No wheezing or rales.    Abdominal:      General: Bowel sounds are normal. There is no distension. Palpations: Abdomen is soft. Tenderness: There is abdominal tenderness in the suprapubic area. There is left CVA tenderness. There is no right CVA tenderness, guarding or rebound. Negative signs include Bran's sign, Rovsing's sign and McBurney's sign. Musculoskeletal:         General: Normal range of motion. Cervical back: Normal range of motion and neck supple. Lymphadenopathy:      Cervical: No cervical adenopathy. Skin:     General: Skin is warm and dry. Capillary Refill: Capillary refill takes less than 2 seconds. Neurological:      General: No focal deficit present. Mental Status: She is alert and oriented to person, place, and time. Psychiatric:         Mood and Affect: Mood normal.         Behavior: Behavior normal.           Diagnostic Study Results     Labs -     Recent Results (from the past 12 hour(s))   URINALYSIS W/ RFLX MICROSCOPIC    Collection Time: 07/16/21  7:36 AM   Result Value Ref Range    Color YELLOW      Appearance CLEAR      Specific gravity 1.007 1.005 - 1.030      pH (UA) 6.5 5.0 - 8.0      Protein TRACE (A) NEG mg/dL    Glucose Negative NEG mg/dL    Ketone Negative NEG mg/dL    Bilirubin Negative NEG      Blood TRACE (A) NEG      Urobilinogen 0.2 0.2 - 1.0 EU/dL    Nitrites Negative NEG      Leukocyte Esterase MODERATE (A) NEG         Radiologic Studies -   No orders to display     CT Results  (Last 48 hours)    None        CXR Results  (Last 48 hours)    None            Medical Decision Making   I am the first provider for this patient. I reviewed the vital signs, available nursing notes, past medical history, past surgical history, family history and social history. Vital Signs-Reviewed the patient's vital signs.     Records Reviewed: Nursing Notes and Old Medical Records    ED Course:   Remained stable during hemorrhage department stay, felt better to treatment    Disposition:  Discharge    DISCHARGE NOTE:     Pt has been reexamined. Patient has no new complaints, changes, or physical findings. Care plan outlined and precautions discussed. Results of urinalysis were reviewed with the patient. All medications were reviewed with the patient; will d/c home with Keflex and Zofran. All of pt's questions and concerns were addressed. Patient was instructed and agrees to follow up with her primary care provider, as well as to return to the ED upon further deterioration. Patient is ready to go home. Follow-up Information     Follow up With Specialties Details Why Contact Info    your primary care provider  Call in 2 days As needed, If symptoms worsen     [facility] EMERGENCY DEPT Emergency Medicine  As needed, If symptoms worsen [address]  [phone]          Current Discharge Medication List      START taking these medications    Details   cephALEXin (KEFLEX) 500 mg capsule Take 1 Capsule by mouth four (4) times daily for 7 days. Qty: 28 Capsule, Refills: 0  Start date: 7/16/2021, End date: 7/23/2021         CONTINUE these medications which have CHANGED    Details   ondansetron hcl (Zofran) 4 mg tablet Take 1 Tablet by mouth every eight (8) hours as needed for Nausea. Qty: 12 Tablet, Refills: 0  Start date: 7/16/2021             Provider Notes (Medical Decision Making):   Urinary symptoms and nausea and left flank pain with positive urinalysis, this is consistent with pyelonephritis. Patient has a benign abdominal exam, have a low suspicion for acute intra-abdominal surgical process. Will check urine culture, treat with Keflex as well as Zofran for symptomatic relief, return precautions, outpatient PCP follow-up. Diagnosis     Clinical Impression:   1.  Pyelonephritis

## 2021-07-16 NOTE — ED TRIAGE NOTES
Per EMS, Patient c/o abdominal x3 days. She states she is also having painful urination. Patient has been taken off her BP medication by her PCP. Her BP was high this morning. Patient is alert and oriented x4, ambulated without difficulty to the medic.

## 2021-07-16 NOTE — ED NOTES
I have reviewed discharge instructions with the patient. The patient verbalized understanding. Dr. Farzana Welch was made aware of patient BP prior to patient being d/c. Instructed patient to follow up with her PCP and try to get back on her BP medications. Patient acknowledged understanding.

## 2021-07-17 LAB
BACTERIA SPEC CULT: NORMAL
SERVICE CMNT-IMP: NORMAL

## 2021-08-12 ENCOUNTER — APPOINTMENT (OUTPATIENT)
Dept: GENERAL RADIOLOGY | Age: 66
End: 2021-08-12
Attending: EMERGENCY MEDICINE
Payer: MEDICARE

## 2021-08-12 ENCOUNTER — HOSPITAL ENCOUNTER (OUTPATIENT)
Age: 66
Setting detail: OBSERVATION
Discharge: HOME | End: 2021-08-15
Attending: EMERGENCY MEDICINE | Admitting: STUDENT IN AN ORGANIZED HEALTH CARE EDUCATION/TRAINING PROGRAM
Payer: MEDICARE

## 2021-08-12 ENCOUNTER — APPOINTMENT (OUTPATIENT)
Dept: CT IMAGING | Age: 66
End: 2021-08-12
Attending: EMERGENCY MEDICINE
Payer: MEDICARE

## 2021-08-12 PROBLEM — K85.90 PANCREATITIS: Status: ACTIVE | Noted: 2021-08-12

## 2021-08-12 LAB
ALBUMIN SERPL-MCNC: 4.9 G/DL (ref 3.4–5)
ALBUMIN/GLOB SERPL: 1 {RATIO} (ref 0.8–1.7)
ALP SERPL-CCNC: 86 U/L (ref 45–117)
ALT SERPL-CCNC: 35 U/L (ref 13–56)
ANION GAP SERPL CALC-SCNC: 11 MMOL/L (ref 3–18)
APPEARANCE UR: ABNORMAL
AST SERPL-CCNC: 28 U/L (ref 10–38)
BACTERIA URNS QL MICRO: ABNORMAL /HPF
BASOPHILS # BLD: 0.1 K/UL (ref 0–0.1)
BASOPHILS NFR BLD: 0 % (ref 0–2)
BILIRUB SERPL-MCNC: 0.4 MG/DL (ref 0.2–1)
BILIRUB UR QL: NEGATIVE
BUN SERPL-MCNC: 8 MG/DL (ref 7–18)
BUN/CREAT SERPL: 7 (ref 12–20)
CALCIUM SERPL-MCNC: 10.4 MG/DL (ref 8.5–10.1)
CALCULATED R AXIS, ECG10: 90 DEGREES
CALCULATED T AXIS, ECG11: 70 DEGREES
CHLORIDE SERPL-SCNC: 99 MMOL/L (ref 100–111)
CK MB CFR SERPL CALC: 2.8 % (ref 0–4)
CK MB SERPL-MCNC: 4.6 NG/ML (ref 5–25)
CK SERPL-CCNC: 163 U/L (ref 26–192)
CO2 SERPL-SCNC: 29 MMOL/L (ref 21–32)
COLOR UR: ABNORMAL
CREAT SERPL-MCNC: 1.16 MG/DL (ref 0.6–1.3)
DIAGNOSIS, 93000: NORMAL
DIFFERENTIAL METHOD BLD: ABNORMAL
EOSINOPHIL # BLD: 0 K/UL (ref 0–0.4)
EOSINOPHIL NFR BLD: 0 % (ref 0–5)
EPITH CASTS URNS QL MICRO: ABNORMAL /LPF (ref 0–5)
ERYTHROCYTE [DISTWIDTH] IN BLOOD BY AUTOMATED COUNT: 12.4 % (ref 11.6–14.5)
GLOBULIN SER CALC-MCNC: 5.1 G/DL (ref 2–4)
GLUCOSE SERPL-MCNC: 169 MG/DL (ref 74–99)
GLUCOSE UR STRIP.AUTO-MCNC: 100 MG/DL
GRAN CASTS URNS QL MICRO: ABNORMAL /LPF
HCT VFR BLD AUTO: 52.9 % (ref 35–45)
HGB BLD-MCNC: 17.9 G/DL (ref 12–16)
HGB UR QL STRIP: ABNORMAL
KETONES UR QL STRIP.AUTO: ABNORMAL MG/DL
LACTATE BLD-SCNC: 4.33 MMOL/L (ref 0.4–2)
LACTATE SERPL-SCNC: 1.2 MMOL/L (ref 0.4–2)
LEUKOCYTE ESTERASE UR QL STRIP.AUTO: ABNORMAL
LYMPHOCYTES # BLD: 3.5 K/UL (ref 0.9–3.6)
LYMPHOCYTES NFR BLD: 24 % (ref 21–52)
MAGNESIUM SERPL-MCNC: 1.9 MG/DL (ref 1.6–2.6)
MCH RBC QN AUTO: 33.5 PG (ref 24–34)
MCHC RBC AUTO-ENTMCNC: 33.8 G/DL (ref 31–37)
MCV RBC AUTO: 98.9 FL (ref 74–97)
MONOCYTES # BLD: 0.7 K/UL (ref 0.05–1.2)
MONOCYTES NFR BLD: 5 % (ref 3–10)
NEUTS SEG # BLD: 10 K/UL (ref 1.8–8)
NEUTS SEG NFR BLD: 70 % (ref 40–73)
NITRITE UR QL STRIP.AUTO: NEGATIVE
PH UR STRIP: 8 [PH] (ref 5–8)
PLATELET # BLD AUTO: 323 K/UL (ref 135–420)
PMV BLD AUTO: 9.8 FL (ref 9.2–11.8)
POTASSIUM SERPL-SCNC: 2.9 MMOL/L (ref 3.5–5.5)
PROT SERPL-MCNC: 10 G/DL (ref 6.4–8.2)
PROT UR STRIP-MCNC: >1000 MG/DL
Q-T INTERVAL, ECG07: 244 MS
QRS DURATION, ECG06: 98 MS
QTC CALCULATION (BEZET), ECG08: 435 MS
RBC # BLD AUTO: 5.35 M/UL (ref 4.2–5.3)
RBC #/AREA URNS HPF: ABNORMAL /HPF (ref 0–5)
SODIUM SERPL-SCNC: 139 MMOL/L (ref 136–145)
SP GR UR REFRACTOMETRY: 1.02 (ref 1–1.03)
TROPONIN I SERPL-MCNC: <0.02 NG/ML (ref 0–0.04)
UROBILINOGEN UR QL STRIP.AUTO: 1 EU/DL (ref 0.2–1)
VENTRICULAR RATE, ECG03: 191 BPM
WBC # BLD AUTO: 14.2 K/UL (ref 4.6–13.2)
WBC URNS QL MICRO: ABNORMAL /HPF (ref 0–5)

## 2021-08-12 PROCEDURE — 74011250636 HC RX REV CODE- 250/636: Performed by: EMERGENCY MEDICINE

## 2021-08-12 PROCEDURE — 96365 THER/PROPH/DIAG IV INF INIT: CPT

## 2021-08-12 PROCEDURE — 82553 CREATINE MB FRACTION: CPT

## 2021-08-12 PROCEDURE — 87077 CULTURE AEROBIC IDENTIFY: CPT

## 2021-08-12 PROCEDURE — 74177 CT ABD & PELVIS W/CONTRAST: CPT

## 2021-08-12 PROCEDURE — 83605 ASSAY OF LACTIC ACID: CPT

## 2021-08-12 PROCEDURE — 80053 COMPREHEN METABOLIC PANEL: CPT

## 2021-08-12 PROCEDURE — 81001 URINALYSIS AUTO W/SCOPE: CPT

## 2021-08-12 PROCEDURE — 99285 EMERGENCY DEPT VISIT HI MDM: CPT

## 2021-08-12 PROCEDURE — 74011000250 HC RX REV CODE- 250: Performed by: EMERGENCY MEDICINE

## 2021-08-12 PROCEDURE — 83735 ASSAY OF MAGNESIUM: CPT

## 2021-08-12 PROCEDURE — 96375 TX/PRO/DX INJ NEW DRUG ADDON: CPT

## 2021-08-12 PROCEDURE — 99218 HC RM OBSERVATION: CPT

## 2021-08-12 PROCEDURE — 99223 1ST HOSP IP/OBS HIGH 75: CPT | Performed by: STUDENT IN AN ORGANIZED HEALTH CARE EDUCATION/TRAINING PROGRAM

## 2021-08-12 PROCEDURE — 71045 X-RAY EXAM CHEST 1 VIEW: CPT

## 2021-08-12 PROCEDURE — 93005 ELECTROCARDIOGRAM TRACING: CPT

## 2021-08-12 PROCEDURE — 74011000636 HC RX REV CODE- 636: Performed by: EMERGENCY MEDICINE

## 2021-08-12 PROCEDURE — 87086 URINE CULTURE/COLONY COUNT: CPT

## 2021-08-12 PROCEDURE — 65270000029 HC RM PRIVATE

## 2021-08-12 PROCEDURE — 85025 COMPLETE CBC W/AUTO DIFF WBC: CPT

## 2021-08-12 PROCEDURE — 74011250637 HC RX REV CODE- 250/637: Performed by: EMERGENCY MEDICINE

## 2021-08-12 PROCEDURE — 87040 BLOOD CULTURE FOR BACTERIA: CPT

## 2021-08-12 PROCEDURE — 87186 SC STD MICRODIL/AGAR DIL: CPT

## 2021-08-12 PROCEDURE — 96374 THER/PROPH/DIAG INJ IV PUSH: CPT

## 2021-08-12 RX ORDER — POTASSIUM CHLORIDE 20 MEQ/1
40 TABLET, EXTENDED RELEASE ORAL
Status: COMPLETED | OUTPATIENT
Start: 2021-08-12 | End: 2021-08-12

## 2021-08-12 RX ORDER — SODIUM CHLORIDE 0.9 % (FLUSH) 0.9 %
5-40 SYRINGE (ML) INJECTION EVERY 8 HOURS
Status: DISCONTINUED | OUTPATIENT
Start: 2021-08-12 | End: 2021-08-15 | Stop reason: HOSPADM

## 2021-08-12 RX ORDER — ONDANSETRON 2 MG/ML
4 INJECTION INTRAMUSCULAR; INTRAVENOUS
Status: DISCONTINUED | OUTPATIENT
Start: 2021-08-12 | End: 2021-08-15 | Stop reason: HOSPADM

## 2021-08-12 RX ORDER — AMLODIPINE BESYLATE 10 MG/1
5 TABLET ORAL DAILY
COMMUNITY

## 2021-08-12 RX ORDER — ENOXAPARIN SODIUM 100 MG/ML
40 INJECTION SUBCUTANEOUS DAILY
Status: DISCONTINUED | OUTPATIENT
Start: 2021-08-13 | End: 2021-08-15 | Stop reason: HOSPADM

## 2021-08-12 RX ORDER — ACETAMINOPHEN 325 MG/1
650 TABLET ORAL
Status: DISCONTINUED | OUTPATIENT
Start: 2021-08-12 | End: 2021-08-15 | Stop reason: HOSPADM

## 2021-08-12 RX ORDER — METOPROLOL TARTRATE 25 MG/1
12.5 TABLET, FILM COATED ORAL EVERY 12 HOURS
Status: DISCONTINUED | OUTPATIENT
Start: 2021-08-12 | End: 2021-08-15 | Stop reason: HOSPADM

## 2021-08-12 RX ORDER — ONDANSETRON 2 MG/ML
8 INJECTION INTRAMUSCULAR; INTRAVENOUS
Status: COMPLETED | OUTPATIENT
Start: 2021-08-12 | End: 2021-08-12

## 2021-08-12 RX ORDER — ACETAMINOPHEN 650 MG/1
650 SUPPOSITORY RECTAL
Status: DISCONTINUED | OUTPATIENT
Start: 2021-08-12 | End: 2021-08-15 | Stop reason: HOSPADM

## 2021-08-12 RX ORDER — SODIUM CHLORIDE 0.9 % (FLUSH) 0.9 %
5-40 SYRINGE (ML) INJECTION AS NEEDED
Status: DISCONTINUED | OUTPATIENT
Start: 2021-08-12 | End: 2021-08-15 | Stop reason: HOSPADM

## 2021-08-12 RX ORDER — POLYETHYLENE GLYCOL 3350 17 G/17G
17 POWDER, FOR SOLUTION ORAL DAILY PRN
Status: DISCONTINUED | OUTPATIENT
Start: 2021-08-12 | End: 2021-08-15 | Stop reason: HOSPADM

## 2021-08-12 RX ORDER — HYDROMORPHONE HYDROCHLORIDE 1 MG/ML
1 INJECTION, SOLUTION INTRAMUSCULAR; INTRAVENOUS; SUBCUTANEOUS ONCE
Status: COMPLETED | OUTPATIENT
Start: 2021-08-12 | End: 2021-08-12

## 2021-08-12 RX ORDER — ONDANSETRON 4 MG/1
4 TABLET, ORALLY DISINTEGRATING ORAL
Status: DISCONTINUED | OUTPATIENT
Start: 2021-08-12 | End: 2021-08-15 | Stop reason: HOSPADM

## 2021-08-12 RX ORDER — AMLODIPINE BESYLATE 5 MG/1
5 TABLET ORAL DAILY
Status: DISCONTINUED | OUTPATIENT
Start: 2021-08-13 | End: 2021-08-15 | Stop reason: HOSPADM

## 2021-08-12 RX ADMIN — METOPROLOL TARTRATE 12.5 MG: 25 TABLET, FILM COATED ORAL at 13:22

## 2021-08-12 RX ADMIN — IOPAMIDOL 80 ML: 612 INJECTION, SOLUTION INTRAVENOUS at 13:53

## 2021-08-12 RX ADMIN — SODIUM CHLORIDE 1000 ML: 900 INJECTION, SOLUTION INTRAVENOUS at 13:22

## 2021-08-12 RX ADMIN — CEFTRIAXONE SODIUM 2 G: 2 INJECTION, POWDER, FOR SOLUTION INTRAMUSCULAR; INTRAVENOUS at 19:31

## 2021-08-12 RX ADMIN — POTASSIUM CHLORIDE 40 MEQ: 1500 TABLET, EXTENDED RELEASE ORAL at 13:22

## 2021-08-12 RX ADMIN — ONDANSETRON 8 MG: 2 INJECTION INTRAMUSCULAR; INTRAVENOUS at 19:21

## 2021-08-12 RX ADMIN — HYDROMORPHONE HYDROCHLORIDE 1 MG: 1 INJECTION, SOLUTION INTRAMUSCULAR; INTRAVENOUS; SUBCUTANEOUS at 19:25

## 2021-08-12 NOTE — ED PROVIDER NOTES
EMERGENCY DEPARTMENT HISTORY AND PHYSICAL EXAM  This was created with voice recognition software and transcription errors may be present. 10:17 AM  Date: 2021  Patient Name: Antonio Chaparro    History of Presenting Illness     Chief Complaint:    History Provided By:     HPI: Antonio Chaparro is a 77 y.o. female medical history of heroin abuse and hypertension who presents with generalized weakness. Patient notes general fatigue no chest pain or shortness of breath no nausea no vomiting. No aggravating alleviating factors no other associated symptoms    PCP: None      Past History     Past Medical History:  Past Medical History:   Diagnosis Date    [facility]in abuse ([facility] [address]     Hypertension        Past Surgical History:  No past surgical history on file. Family History:  Family History   Problem Relation Age of Onset    Cancer Mother     Heart Disease Father     Hypertension Sister        Social History:  Social History     Tobacco Use    Smoking status: Former Smoker     Packs/day: 0.30     Types: Cigarettes     Quit date: 3/26/2020     Years since quittin.3    Smokeless tobacco: Never Used   Substance Use Topics    Alcohol use: Yes     Comment: occasional    Drug use: Not Currently     Types: Heroin     Comment: taking methadone stop Heroin about 8 years ago       Allergies:  No Known Allergies    Review of Systems     Review of Systems   All other systems reviewed and are negative. 10 point review of systems otherwise negative unless noted in HPI. Physical Exam       Physical Exam  Constitutional:       Appearance: She is well-developed. HENT:      Head: Normocephalic and atraumatic. Eyes:      Pupils: Pupils are equal, round, and reactive to light. Cardiovascular:      Rate and Rhythm: Regular rhythm. Tachycardia present. Heart sounds: Normal heart sounds. No murmur heard. No friction rub. Pulmonary:      Effort: Pulmonary effort is normal. No respiratory distress.       Breath sounds: Normal breath sounds. No wheezing. Abdominal:      General: There is no distension. Palpations: Abdomen is soft. Tenderness: There is no abdominal tenderness. There is no guarding or rebound. Musculoskeletal:         General: Normal range of motion. Cervical back: Normal range of motion and neck supple. Skin:     General: Skin is warm and dry. Neurological:      Mental Status: She is alert and oriented to person, place, and time. Psychiatric:         Behavior: Behavior normal.         Thought Content: Thought content normal.         Diagnostic Study Results     Vital Signs  EKG:  Labs:   Imaging:     Medical Decision Making     ED Course: Progress Notes, Reevaluation, and Consults:    I will be the provider of record for this patient. Provider Notes (Medical Decision Making): Patient has SVT at a rate of 190 will administer adenosine and reassess chest x-ray basic labs\    Broke here without intervention, she notes she has been having symptoms overnight as well as some vomiting. Potassium noted to be 2.9    Patient's had vomiting the past 2 days likely the cause of her low potassium which led to her SVT which is now broken. Will check a CT abdomen turnover to Dr. Sheron Ashley       Diagnosis     Clinical Impression: No diagnosis found. Disposition:        Patient's Medications   Start Taking    No medications on file   Continue Taking    ONDANSETRON HCL (ZOFRAN) 4 MG TABLET    Take 1 Tablet by mouth every eight (8) hours as needed for Nausea.    These Medications have changed    No medications on file   Stop Taking    No medications on file

## 2021-08-12 NOTE — ED TRIAGE NOTES
Pt arrived for abdominal pain from UTI with vomiting frequency, retention. Pt reports UTI three weeks ago with completed medication.       Hx of HTN, UTI

## 2021-08-13 ENCOUNTER — APPOINTMENT (OUTPATIENT)
Dept: MRI IMAGING | Age: 66
End: 2021-08-13
Attending: STUDENT IN AN ORGANIZED HEALTH CARE EDUCATION/TRAINING PROGRAM
Payer: MEDICARE

## 2021-08-13 LAB
ALBUMIN SERPL-MCNC: 4.3 G/DL (ref 3.4–5)
ALBUMIN/GLOB SERPL: 1 {RATIO} (ref 0.8–1.7)
ALP SERPL-CCNC: 68 U/L (ref 45–117)
ALT SERPL-CCNC: 30 U/L (ref 13–56)
ANION GAP SERPL CALC-SCNC: 5 MMOL/L (ref 3–18)
AST SERPL-CCNC: 29 U/L (ref 10–38)
BILIRUB SERPL-MCNC: 0.3 MG/DL (ref 0.2–1)
BUN SERPL-MCNC: 11 MG/DL (ref 7–18)
BUN/CREAT SERPL: 15 (ref 12–20)
CALCIUM SERPL-MCNC: 10 MG/DL (ref 8.5–10.1)
CHLORIDE SERPL-SCNC: 102 MMOL/L (ref 100–111)
CHOLEST SERPL-MCNC: 238 MG/DL
CK MB CFR SERPL CALC: 3 % (ref 0–4)
CK MB SERPL-MCNC: 8.7 NG/ML (ref 5–25)
CK SERPL-CCNC: 289 U/L (ref 26–192)
CO2 SERPL-SCNC: 32 MMOL/L (ref 21–32)
CREAT SERPL-MCNC: 0.71 MG/DL (ref 0.6–1.3)
ERYTHROCYTE [DISTWIDTH] IN BLOOD BY AUTOMATED COUNT: 12.7 % (ref 11.6–14.5)
GLOBULIN SER CALC-MCNC: 4.4 G/DL (ref 2–4)
GLUCOSE SERPL-MCNC: 94 MG/DL (ref 74–99)
HCT VFR BLD AUTO: 48.5 % (ref 35–45)
HDLC SERPL-MCNC: 68 MG/DL (ref 40–60)
HDLC SERPL: 3.5 {RATIO} (ref 0–5)
HGB BLD-MCNC: 15.6 G/DL (ref 12–16)
LDLC SERPL CALC-MCNC: 150.6 MG/DL (ref 0–100)
LIPID PROFILE,FLP: ABNORMAL
MCH RBC QN AUTO: 32.1 PG (ref 24–34)
MCHC RBC AUTO-ENTMCNC: 32.2 G/DL (ref 31–37)
MCV RBC AUTO: 99.8 FL (ref 74–97)
PLATELET # BLD AUTO: 262 K/UL (ref 135–420)
PMV BLD AUTO: 10.2 FL (ref 9.2–11.8)
POTASSIUM SERPL-SCNC: 4.1 MMOL/L (ref 3.5–5.5)
PROT SERPL-MCNC: 8.7 G/DL (ref 6.4–8.2)
RBC # BLD AUTO: 4.86 M/UL (ref 4.2–5.3)
SODIUM SERPL-SCNC: 139 MMOL/L (ref 136–145)
TRIGL SERPL-MCNC: 97 MG/DL (ref ?–150)
TROPONIN I SERPL-MCNC: <0.02 NG/ML (ref 0–0.04)
VLDLC SERPL CALC-MCNC: 19.4 MG/DL
WBC # BLD AUTO: 12.8 K/UL (ref 4.6–13.2)

## 2021-08-13 PROCEDURE — 82553 CREATINE MB FRACTION: CPT

## 2021-08-13 PROCEDURE — 80353 DRUG SCREENING COCAINE: CPT

## 2021-08-13 PROCEDURE — 86803 HEPATITIS C AB TEST: CPT

## 2021-08-13 PROCEDURE — 97165 OT EVAL LOW COMPLEX 30 MIN: CPT

## 2021-08-13 PROCEDURE — 96375 TX/PRO/DX INJ NEW DRUG ADDON: CPT

## 2021-08-13 PROCEDURE — 74011000250 HC RX REV CODE- 250: Performed by: INTERNAL MEDICINE

## 2021-08-13 PROCEDURE — 86706 HEP B SURFACE ANTIBODY: CPT

## 2021-08-13 PROCEDURE — 2709999900 HC NON-CHARGEABLE SUPPLY

## 2021-08-13 PROCEDURE — 80361 OPIATES 1 OR MORE: CPT

## 2021-08-13 PROCEDURE — 96372 THER/PROPH/DIAG INJ SC/IM: CPT

## 2021-08-13 PROCEDURE — 86301 IMMUNOASSAY TUMOR CA 19-9: CPT

## 2021-08-13 PROCEDURE — 80061 LIPID PANEL: CPT

## 2021-08-13 PROCEDURE — 87389 HIV-1 AG W/HIV-1&-2 AB AG IA: CPT

## 2021-08-13 PROCEDURE — 74011250637 HC RX REV CODE- 250/637: Performed by: STUDENT IN AN ORGANIZED HEALTH CARE EDUCATION/TRAINING PROGRAM

## 2021-08-13 PROCEDURE — 80307 DRUG TEST PRSMV CHEM ANLYZR: CPT

## 2021-08-13 PROCEDURE — 65270000029 HC RM PRIVATE

## 2021-08-13 PROCEDURE — 97161 PT EVAL LOW COMPLEX 20 MIN: CPT

## 2021-08-13 PROCEDURE — 99232 SBSQ HOSP IP/OBS MODERATE 35: CPT | Performed by: INTERNAL MEDICINE

## 2021-08-13 PROCEDURE — 99218 HC RM OBSERVATION: CPT

## 2021-08-13 PROCEDURE — 74011250636 HC RX REV CODE- 250/636: Performed by: INTERNAL MEDICINE

## 2021-08-13 PROCEDURE — 74181 MRI ABDOMEN W/O CONTRAST: CPT

## 2021-08-13 PROCEDURE — 96376 TX/PRO/DX INJ SAME DRUG ADON: CPT

## 2021-08-13 PROCEDURE — 36415 COLL VENOUS BLD VENIPUNCTURE: CPT

## 2021-08-13 PROCEDURE — 87340 HEPATITIS B SURFACE AG IA: CPT

## 2021-08-13 PROCEDURE — 86708 HEPATITIS A ANTIBODY: CPT

## 2021-08-13 PROCEDURE — 85027 COMPLETE CBC AUTOMATED: CPT

## 2021-08-13 PROCEDURE — 74011250636 HC RX REV CODE- 250/636: Performed by: STUDENT IN AN ORGANIZED HEALTH CARE EDUCATION/TRAINING PROGRAM

## 2021-08-13 PROCEDURE — 80053 COMPREHEN METABOLIC PANEL: CPT

## 2021-08-13 PROCEDURE — 97535 SELF CARE MNGMENT TRAINING: CPT

## 2021-08-13 PROCEDURE — 86704 HEP B CORE ANTIBODY TOTAL: CPT

## 2021-08-13 RX ORDER — MORPHINE SULFATE 2 MG/ML
1 INJECTION, SOLUTION INTRAMUSCULAR; INTRAVENOUS
Status: DISCONTINUED | OUTPATIENT
Start: 2021-08-13 | End: 2021-08-13

## 2021-08-13 RX ORDER — MORPHINE SULFATE 2 MG/ML
1 INJECTION, SOLUTION INTRAMUSCULAR; INTRAVENOUS
Status: COMPLETED | OUTPATIENT
Start: 2021-08-13 | End: 2021-08-13

## 2021-08-13 RX ORDER — OXYCODONE AND ACETAMINOPHEN 5; 325 MG/1; MG/1
1 TABLET ORAL
Status: DISCONTINUED | OUTPATIENT
Start: 2021-08-13 | End: 2021-08-15 | Stop reason: HOSPADM

## 2021-08-13 RX ORDER — MORPHINE SULFATE 2 MG/ML
2 INJECTION, SOLUTION INTRAMUSCULAR; INTRAVENOUS
Status: DISCONTINUED | OUTPATIENT
Start: 2021-08-13 | End: 2021-08-15 | Stop reason: HOSPADM

## 2021-08-13 RX ORDER — METRONIDAZOLE 500 MG/100ML
500 INJECTION, SOLUTION INTRAVENOUS EVERY 8 HOURS
Status: DISCONTINUED | OUTPATIENT
Start: 2021-08-13 | End: 2021-08-15 | Stop reason: HOSPADM

## 2021-08-13 RX ADMIN — MORPHINE SULFATE 1 MG: 2 INJECTION, SOLUTION INTRAMUSCULAR; INTRAVENOUS at 00:51

## 2021-08-13 RX ADMIN — Medication 10 ML: at 17:22

## 2021-08-13 RX ADMIN — ONDANSETRON 4 MG: 2 INJECTION INTRAMUSCULAR; INTRAVENOUS at 00:51

## 2021-08-13 RX ADMIN — AMLODIPINE BESYLATE 5 MG: 5 TABLET ORAL at 08:19

## 2021-08-13 RX ADMIN — METOPROLOL TARTRATE 12.5 MG: 25 TABLET, FILM COATED ORAL at 21:21

## 2021-08-13 RX ADMIN — MORPHINE SULFATE 2 MG: 2 INJECTION, SOLUTION INTRAMUSCULAR; INTRAVENOUS at 07:45

## 2021-08-13 RX ADMIN — METOPROLOL TARTRATE 12.5 MG: 25 TABLET, FILM COATED ORAL at 09:22

## 2021-08-13 RX ADMIN — METRONIDAZOLE 500 MG: 500 INJECTION, SOLUTION INTRAVENOUS at 17:22

## 2021-08-13 RX ADMIN — Medication 10 ML: at 21:24

## 2021-08-13 RX ADMIN — MORPHINE SULFATE 2 MG: 2 INJECTION, SOLUTION INTRAMUSCULAR; INTRAVENOUS at 21:21

## 2021-08-13 RX ADMIN — ENOXAPARIN SODIUM 40 MG: 40 INJECTION SUBCUTANEOUS at 09:22

## 2021-08-13 RX ADMIN — MORPHINE SULFATE 2 MG: 2 INJECTION, SOLUTION INTRAMUSCULAR; INTRAVENOUS at 14:03

## 2021-08-13 RX ADMIN — MORPHINE SULFATE 1 MG: 2 INJECTION, SOLUTION INTRAMUSCULAR; INTRAVENOUS at 03:12

## 2021-08-13 RX ADMIN — CEFTRIAXONE SODIUM 2 G: 2 INJECTION, POWDER, FOR SOLUTION INTRAMUSCULAR; INTRAVENOUS at 17:21

## 2021-08-13 NOTE — ED NOTES
Assume care of patient, patient alert and oriented x 4, skin warm and dry, patient VS stable, patient waiting for admission bd.    Purposeful rounding completed:    Side rails up x 2:  YES  Bed in low position and wheels locked: YES  Call bell within reach: YES  Comfort addressed: YES    Toileting needs addressed: YES  Plan of care reviewed/updated with patient and or family members: YES  IV site assessed: YES  Pain assessed and addressed: YES, 4

## 2021-08-13 NOTE — ED NOTES
Patient sitting up on stretcher, complaining of abdominal pain. Will page hospitalist for PRN pain medication. Patient denies chest pain, SOB. No episode of vomiting noted. Will continue to closely monitor patient.

## 2021-08-13 NOTE — PROGRESS NOTES
PHYSICAL THERAPY EVALUATION AND DISCHARGE    Patient: Sahil Elmore (30 y.o. female)  Date: 8/13/2021  Primary Diagnosis: Pancreatitis [K85.90]        Precautions:    (standard)    PLOF: independent with mobility without an assistive device    ASSESSMENT :  Based on the objective data described below, the patient presents at an independent level with ambulation and transfers without an assistive device. Patient does not require further skilled intervention at this level of care. PLAN :  Recommendations and Planned Interventions:   No formal PT needs identified at this time. Discharge Recommendations: None  Further Equipment Recommendations for Discharge: N/A     SUBJECTIVE:   Patient stated I'm doing alright.     OBJECTIVE DATA SUMMARY:     Past Medical History:   Diagnosis Date    Heroin abuse ([facility] [address]     Hypertension    No past surgical history on file. Barriers to Learning/Limitations: None  Compensate with: N/A  Home Situation:   Home Situation  Home Environment: Private residence  # Steps to Enter: 3  Rails to Enter: Yes  One/Two Story Residence: Two story  Living Alone: No  Support Systems: Family member(s), Friends \ neighbors  Patient Expects to be Discharged to[de-identified] [employer]  Current DME Used/Available at Home: None  Tub or Shower Type: Tub/Shower combination  Critical Behavior:  Neurologic State: Alert  Orientation Level: Oriented X4  Cognition: Follows commands  Safety/Judgement: Awareness of environment  Strength:    Strength:  Within functional limits      Tone & Sensation:   Tone: Normal   Sensation: Intact   Range Of Motion:  AROM: Within functional limits       Functional Mobility:  Bed Mobility:  Rolling: Independent  Supine to Sit: Independent  Sit to Supine: Independent  Scooting: Independent  Transfers:  Sit to Stand: Independent  Stand to Sit: Independent  Balance:   Sitting: Intact  Standing: Intact  Ambulation/Gait Training:  Distance (ft): 250 Feet (ft)  Assistive Device: (none)  Ambulation - Level of Assistance: Independent  Pain:  Pain level pre-treatment: 7/10   Pain level post-treatment: 7/10  Pain Intervention(s): Medication (see MAR) nurse was giving upon entering the room for evaluation; Rest, Repositioning   Response to intervention: Nurse notified, See doc flow    Activity Tolerance:   good  Please refer to the flowsheet for vital signs taken during this treatment. After treatment:   []         Patient left in no apparent distress sitting up in chair  [x]         Patient left in no apparent distress in bed  [x]         Call bell left within reach  [x]         Nursing notified  [x]         Caregiver present  []         Bed alarm activated  []         SCDs applied    COMMUNICATION/EDUCATION:   [x]         Role of Physical Therapy in the acute care setting. [x]         Fall prevention education was provided and the patient/caregiver indicated understanding. [x]         Patient/family have participated as able in goal setting and plan of care.-eval only  []         Patient/family agree to work toward stated goals and plan of care. []         Patient understands intent and goals of therapy, but is neutral about his/her participation. []         Patient is unable to participate in goal setting/plan of care: ongoing with therapy staff.  []         Other:     Thank you for this referral.  Jose Daniel Delgado, PT   Time Calculation: 9 mins      Eval Complexity: History: HIGH Complexity :3+ comorbidities / personal factors will impact the outcome/ POC Exam:MEDIUM Complexity : 3 Standardized tests and measures addressing body structure, function, activity limitation and / or participation in recreation  Presentation: MEDIUM Complexity : Evolving with changing characteristics  Clinical Decision Making:Low Complexity    Overall Complexity:LOW

## 2021-08-13 NOTE — PROGRESS NOTES
[facility] Hospitalist Group  Progress Note    Patient: Sonya Perez Age: 77 y.o. : 1955 MR#: [id number] SSN: [id number]  Date/Time: 2021    Subjective:     Pt has c/o left lower quadrant pain. Some nausea this am per nurse at bedside. Assessment/Plan:   77year old female w/ h/o heroin use admitted after presenting w/ c/o abd pain and abnormal biliary/pancreatic imaging on CT. -LLQ abdominal pain   -CT finding of distal sigmoid colon findings indicating possible mild segmental colitis  -CBD severe dilitation  -? Pancreatic head leasion   -Leukocytosis: concern for biliary infection, resolved after a dose of ceftriaxone.  -Heroine abuse: per report.  Pt's last use was recently    PLAN:  -Start ABx for biliary infection  -Trend temp and wbc  -Outpt EUS/ERCP to be arranged by GI, discussed w/ GI      Dispo: Gi recommends 48 hrs of IV abx, possible dc on Monday on oral abx                                   Additional Notes:      Case discussed with:  [x]Patient  []Family  [x]Nursing  []Case Management  DVT Prophylaxis:  [x]Lovenox  []Hep SQ  []SCDs  []Coumadin   []On Heparin gtt    Objective:   VS:   Visit Vitals  BP (!) 159/79   Pulse 62   Temp 98.5 °F (36.9 °C)   Resp 18   Wt 53.1 kg (117 lb)   SpO2 95%   Breastfeeding No   BMI 22.85 kg/m²      Tmax/24hrs: Temp (24hrs), Av.1 °F (36.7 °C), Min:97.8 °F (36.6 °C), Max:98.5 °F (36.9 °C)    Input/Output: No intake or output data in the 24 hours ending 21 1546    General:  Alert, awake, in NAD  Cardiovascular:  RRR, no murmurs  Pulmonary:  CTAB  GI:  LLQ tenderness w/o guarding, no rebound, hypoactive bowel sounds  Extremities:  No edema  Additional:      Labs:    Recent Results (from the past 24 hour(s))   LACTIC ACID    Collection Time: 21  8:15 PM   Result Value Ref Range    Lactic acid 1.2 0.4 - 2.0 MMOL/L   METABOLIC PANEL, COMPREHENSIVE    Collection Time: 21  4:50 AM   Result Value Ref Range Sodium 139 136 - 145 mmol/L    Potassium 4.1 3.5 - 5.5 mmol/L    Chloride 102 100 - 111 mmol/L    CO2 32 21 - 32 mmol/L    Anion gap 5 3.0 - 18 mmol/L    Glucose 94 74 - 99 mg/dL    BUN 11 7.0 - 18 MG/DL    Creatinine 0.71 0.6 - 1.3 MG/DL    BUN/Creatinine ratio 15 12 - 20      GFR est AA >60 >60 ml/min/1.73m2    GFR est non-AA >60 >60 ml/min/1.73m2    Calcium 10.0 8.5 - 10.1 MG/DL    Bilirubin, total 0.3 0.2 - 1.0 MG/DL    ALT (SGPT) 30 13 - 56 U/L    AST (SGOT) 29 10 - 38 U/L    Alk.  phosphatase 68 45 - 117 U/L    Protein, total 8.7 (H) 6.4 - 8.2 g/dL    Albumin 4.3 3.4 - 5.0 g/dL    Globulin 4.4 (H) 2.0 - 4.0 g/dL    A-G Ratio 1.0 0.8 - 1.7     CBC W/O DIFF    Collection Time: 08/13/21  4:50 AM   Result Value Ref Range    WBC 12.8 4.6 - 13.2 K/uL    RBC 4.86 4.20 - 5.30 M/uL    HGB 15.6 12.0 - 16.0 g/dL    HCT 48.5 (H) 35.0 - 45.0 %    MCV 99.8 (H) 74.0 - 97.0 FL    MCH 32.1 24.0 - 34.0 PG    MCHC 32.2 31.0 - 37.0 g/dL    RDW 12.7 11.6 - 14.5 %    PLATELET 054 811 - 863 K/uL    MPV 10.2 9.2 - 11.8 FL   CARDIAC PANEL,(CK, CKMB & TROPONIN)    Collection Time: 08/13/21  4:50 AM   Result Value Ref Range    CK - MB 8.7 (H) <3.6 ng/ml    CK-MB Index 3.0 0.0 - 4.0 %     (H) 26 - 192 U/L    Troponin-I, QT <0.02 0.0 - 0.045 NG/ML   LIPID PANEL    Collection Time: 08/13/21  4:50 AM   Result Value Ref Range    LIPID PROFILE          Cholesterol, total 238 (H) <200 MG/DL    Triglyceride 97 <150 MG/DL    HDL Cholesterol 68 (H) 40 - 60 MG/DL    LDL, calculated 150.6 (H) 0 - 100 MG/DL    VLDL, calculated 19.4 MG/DL    CHOL/HDL Ratio 3.5 0 - 5.0       Additional Data Reviewed:      Signed By: Lucia Llanes MD     August 13, 2021 3:46 PM

## 2021-08-13 NOTE — CONSULTS
[email or web address]  [phone]    GASTROENTEROLOGY CONSULT      Impression:   1. Pancreatitis, chronic with calcifications  2. Severe biliary dilatation of the common bile duct up to 1.7 cm  3 ? 5 mm hypodense lesion in the pancreatic head  4. Abdominal pain  5. ? wall thickening of the distal sigmoid colon  6. Long term inhalation of heroin for few years   7. Hypertension with SBP in the low 200's  8. Alcohol use above sensible limits  9. Chronic liver disease without hepatoma  10. Diverticulosis without diverticulitis  11. UTI  12. Leukocytosis  - better - previously 14.2k     CT ABD PELV W CONT    Result Date: 8/12/2021  Extensive colonic diverticulosis without definite acute diverticulitis. Questionable wall thickening of the distal sigmoid colon without definite adjacent inflammation. A mild segmental colitis is possible. Additionally, would recommend ensuring that the patient is current on colonoscopy screening to exclude underlying mass. Severe biliary dilatation of the common bile duct measuring up to 17 mm with tapering at the level of the intrapancreatic CBD. Coarse calcifications in the uncinate process of pancreas suggest chronic pancreatitis. Additionally, there is a questionable 5 mm hypodense lesion in the pancreatic head which is nonspecific given the suspected chronic pancreatitis. This may be an area of fibrotic change, tiny cystic lesion, or early pancreatic adenocarcinoma. The biliary dilatation could be secondary to either benign stricturing from chronic pancreatitis or biliary obstruction from early malignancy. Recommend abdominal MRI without and with intravenous contrast for further assessment. Hepatic steatosis with morphologic changes of the liver. XR CHEST PORT    Result Date: 8/12/2021  No acute findings. .      Plan:     1. Await MRI w MRCP - in progress. ERCP/EUS with fine-needle aspiration will be coordinated with Dr Dilcia Enrique.  Dr Koroma Memory on-call this weekend and will make arrangements if interfacility transfer is needed versus outpatient coordination. 2. Consult to IR placed for evaluation for cholecystostomy as appropriate. 3. Labs ordered - CA 19-9 Ag, viral hepatitis, UDS, HIV  4. Will need outpatient colonoscopy. 5. Monitor of withdrawal symptoms given long term heroin use. 6. Serial abdominal checks as well as monitor for fever/jaundice. 7. Low-fat diet as per primary team  8. Can consider trial of naltrexone 50 mg once daily to help alcohol cessation. 9. Medical management otherwise per primary team.    Additional input after final MRI report. Patient has permitted that her sister, Jennifer Marti, [phone] is updated. Chief Complaint: weakness, abdominal pain      HPI:  Seda Nath is a 77 y.o. female who I am being asked to see in consultation for an opinion regarding above. Patient arrived to [facility] emergency department 8/12/2021. She reports that worsening generalized weakness brought her to the ED. Her ED work-up noted that patient had SVT with heart rate of 190 which responded with adenosine. For reports of vomiting x 2 days, CT abd/pelvis was ordered with concerning features as noted below in radiology summary. Dr Zeina Puga was contacted with recommended with MRI w MRCP and possible cholecystostomy until ERCP/EUS. She was seen in ED today. Has abdominal pain, no nausea, vomiting, fever, chills, nausea. Feeling \"terrible\", but no chest pain or shortness of breath. Awaiting for get MRI w MRCP. Her sister at bedside. She notes that prior to ED visit, was having worsening central abdominal pain for past 2 month. Episodes of nausea, vomiting of gastric juices, fluctuating weight (7-8 lbs past 1 month) and appetite. Started amlodipine for BP within past 1 month, but no new medications. Long term heroin user and last inhaled last night - no injecting. Reports that she did not tell the ED staff of her recent/ongoing drug use. Notes that she wants to quit.  She also admits to excess also intake of wine at 12% volume (wild t[employer] nie[employer]). No personal or family history of pancreatic disorders/cancer. No prior EGD or colonoscopy. PMH:   Past Medical History:   Diagnosis Date    Heroin abuse ([address]     Hypertension        PSH:   No past surgical history on file. Social HX:   Social History     Socioeconomic History    Marital status: SINGLE     Spouse name: Not on file    Number of children: Not on file    Years of education: Not on file    Highest education level: Not on file   Occupational History    Not on file   Tobacco Use    Smoking status: Former Smoker     Packs/day: 0.30     Types: Cigarettes     Quit date: 3/26/2020     Years since quittin.3    Smokeless tobacco: Never Used   Substance and Sexual Activity    Alcohol use: Yes     Comment: occasional    Drug use: Not Currently     Types: Heroin     Comment: taking methadone stop Heroin about 8 years ago    Sexual activity: Not on file   Other Topics Concern    Not on file   Social History Narrative    Not on file     Social Determinants of Health     Financial Resource Strain:     Difficulty of Paying Living Expenses:    Food Insecurity:     Worried About Running Out of Food in the Last Year:     [address] in the Last Year:    Transportation Needs:     Lack of Transportation (Medical):      Lack of Transportation (Non-Medical):    Physical Activity:     Days of Exercise per Week:     Minutes of Exercise per Session:    Stress:     Feeling of Stress :    Social Connections:     Frequency of Communication with Friends and Family:     Frequency of Social Gatherings with Friends and Family:     Attends [employer] Services:     Active Member of Clubs or Organizations:     Attends Club or Organization Meetings:     Marital Status:    Intimate Partner Violence:     Fear of Current or Ex-Partner:     Emotionally Abused:     Physically Abused:     Sexually Abused:        FHX:   Family History Problem Relation Age of Onset    Cancer Mother     Heart Disease Father     Hypertension Sister        Allergy:   No Known Allergies    Patient Active Problem List   Diagnosis Code    HTN (hypertension) I10    Epistaxis R04.0    Pancreatitis K85.90       Home Medications:     Medications Prior to Admission   Medication Sig    amLODIPine (NORVASC) 10 mg tablet Take 5 mg by mouth daily. Indications: high blood pressure       Review of Systems:     Constitutional: No fevers, chills, + weight loss    Skin: No rashes, pruritis, jaundice   HENT: No nosebleed, sinus pressure, rhinorrhea, sore throat. Eyes: No photophobia, jaundice. Cardiovascular: No chest pain, heart palpitations. Respiratory: No cough, shortness of breath   Gastrointestinal: As in HPI   Genitourinary: No bleeding   Musculoskeletal: Generalized weakness   Endo: No sweats. Heme: No bruising, easy bleeding. Allergies: As noted. Neurological: Alert and oriented. Gait not assessed. Psychiatric:  No anxiety, depression, hallucinations. Visit Vitals  BP (!) 172/78   Pulse 67   Temp 97.8 °F (36.6 °C)   Resp 18   Wt 53.1 kg (117 lb)   SpO2 100%   BMI 22.85 kg/m²       Physical Assessment:     constitutional: appearance: well developed, no deformities, in no acute distress. skin: inspection: no rashes, ulcers, icterus  eyes: inspection: normal conjunctivae and lids; no jaundice  ENMT: wearing mask due to COVID-19 pandemic  neck: thyroid: normal size, consistency and position; no masses or tenderness. respiratory: effort: normal chest excursion; no intercostal retraction or accessory muscle use. cardiovascular: normal rate  abdominal: abdomen: normal consistency; + tenderness  > LUQ, no masses, no hernias appreciated. rectal: hemoccult/guaiac: not performed. musculoskeletal: no deformity or contracture. neurologic: grossly intact by observation  psychiatric: judgement/insight: within normal limits.  memory: within normal limits for recent and remote events. mood and affect: no evidence of depression, anxiety or agitation. orientation: oriented to time, space and person. Basic Metabolic Profile   Recent Labs     08/13/21  0450 08/12/21  1000 08/12/21  1000      < > 139   K 4.1   < > 2.9*      < > 99*   CO2 32   < > 29   BUN 11   < > 8   GLU 94   < > 169*   CA 10.0   < > 10.4*   MG  --   --  1.9    < > = values in this interval not displayed. CBC w/Diff    Recent Labs     08/13/21  0450   WBC 12.8   RBC 4.86   HGB 15.6   HCT 48.5*   MCV 99.8*   MCH 32.1   MCHC 32.2   RDW 12.7       Recent Labs     08/12/21  1000   GRANS 70   LYMPH 24   EOS 0        Hepatic Function   Recent Labs     08/13/21  0450   ALB 4.3   TP 8.7*   TBILI 0.3   AP 68        Coags   No results for input(s): PTP, INR, APTT, INREXT in the last 72 hours. CT Results (most recent):  Results from Hospital Encounter encounter on 08/12/21    CT ABD PELV W CONT    Narrative  CT Abdomen And Pelvis with Intravenous Contrast    INDICATION: Generalized abdominal pain. TECHNIQUE: 5 mm collimation axial images obtained from the diaphragm to the  level of the pubic symphysis following the uneventful administration of  100 cc  of low osmolar, nonionic intravenous contrast.    Dose reduction techniques used: Automated exposure control, adjustment of the  mAs and/or kVp according to patient size, standardized low-dose protocol, and/or  iterative reconstruction technique. COMPARISON: None. ABDOMEN FINDINGS:    Lung Bases: Bibasilar atelectasis. Liver: Hepatic steatosis. Morphologic changes of the liver without suspicious  mass. Gallbladder: Present and appears normal. There is significant biliary dilatation  with the common bile duct measuring up to 1.7 cm. This extends to the level of  the pancreatic head. Pancreas: There are calcifications in the uncinate process of the pancreas.  The  pancreatic duct is prominent, but not definitely dilated. . There is a small  focal hypodensity noted in the pancreatic head measuring approximately 5 mm on  image 36.    Spleen: Normal in size. No evidence of mass. .    Adrenal Glands: No evidence for mass. Kidneys:    Right: Normal enhancement. No cortical mass. No hydronephrosis. Left:  Normal enhancement. No cortical mass. No hydronephrosis. Lymph Nodes: No lymphadenopathy. Aorta:   Abdominal aorta is patent with atherosclerosis. Mixing artifact is seen  in the portal vein    PELVIS FINDINGS:    Bowel: Small Bowel: Normal in caliber with normal wall thickness. Large Bowel: There is colonic diverticulosis without definite evidence of acute  diverticulitis. There is a prominent diverticulum arising from the posterior  upper rectum. There is some questionable wall thickening of the distal sigmoid  colon without adjacent inflammation  Appendix: Normal appendix. Bladder: Normal.    Uterus and adnexa are unremarkable. No ascites or free air. Bones: No acute finding. Grade 2 anterolisthesis of L5 on S1. Impression  Extensive colonic diverticulosis without definite acute diverticulitis. Questionable wall thickening of the distal sigmoid colon without definite  adjacent inflammation. A mild segmental colitis is possible. Additionally, would  recommend ensuring that the patient is current on colonoscopy screening to  exclude underlying mass. Severe biliary dilatation of the common bile duct measuring up to 17 mm with  tapering at the level of the intrapancreatic CBD. Coarse calcifications in the  uncinate process of pancreas suggest chronic pancreatitis. Additionally, there  is a questionable 5 mm hypodense lesion in the pancreatic head which is  nonspecific given the suspected chronic pancreatitis. This may be an area of  fibrotic change, tiny cystic lesion, or early pancreatic adenocarcinoma.  The  biliary dilatation could be secondary to either benign stricturing from chronic  pancreatitis or biliary obstruction from early malignancy. Recommend abdominal  MRI without and with intravenous contrast for further assessment. Hepatic steatosis with morphologic changes of the liver. ESTER [place]. [facility], [address] [place] [address]  Cell: [phone]  [email or web address]

## 2021-08-13 NOTE — PROGRESS NOTES
Problem: Falls - Risk of  Goal: *Absence of Falls  Outcome: Progressing Towards Goal  Note: Fall Risk Interventions:            Medication Interventions: Teach patient to arise slowly                   Problem: Patient Education: Go to Patient Education Activity  Goal: Patient/Family Education  Outcome: Progressing Towards Goal     Problem: Pain  Goal: *Control of Pain  Outcome: Progressing Towards Goal     Problem: Patient Education: Go to Patient Education Activity  Goal: Patient/Family Education  Outcome: Progressing Towards Goal     Problem: Patient Education: Go to Patient Education Activity  Goal: Patient/Family Education  Outcome: Progressing Towards Goal

## 2021-08-13 NOTE — CONSULTS
Interventional Radiology      Consulted for cholecystostomy tube. CT scan shows normal gallbladder with extrahepatic and mild intrahepatic biliary duct dilatation. Normal LFT's bilirubin mild leukocytosis resolved. No indication for cholecystostomy tube placement. Will likely need ERCP noted plans per GI note    No IR intervention planned please advise uf we can be further assistance.      Spence Brittle, PA-C

## 2021-08-13 NOTE — PROGRESS NOTES
Problem: Self Care Deficits Care Plan (Adult)  Goal: *Acute Goals and Plan of Care (Insert Text)  Outcome: Resolved/Met     OCCUPATIONAL THERAPY EVALUATION/DISCHARGE    Patient: Marilin Oliveira (28 y.o. female)  Date: 8/13/2021  Primary Diagnosis: Pancreatitis [K85.90]  Precautions: (standard)  PLOF: Patient was independent with self-care and functional mobility PTA. ASSESSMENT AND RECOMMENDATIONS:  Patient cleared to participate in OT evaluation by RN. Upon entering the room, patient was supine in bed, alert, and agreeable to participate in OT evaluation. Patient educated on the role of OT, evaluation process, and safety during this admission with patient verbalizing understanding. Patient is independent with basic self-care, independent with bed mobility and independent with functional transfers using no AD despite 7/10 pain. Patient /76 supine with not much difference seated EOB. Patient educated on pacing self with positional changes. Based on the objective data described below, the patient presents with no deficits that impede pt function with ADLs, functional transfers, and functional mobility. At this time patient is safe to d/c home with family support. OT to d/c from caseload at this time. Skilled occupational therapy is not indicated at this time. Discharge Recommendations: None  Further Equipment Recommendations for Discharge: N/A      SUBJECTIVE:   Patient stated I'm doing ok    OBJECTIVE DATA SUMMARY:     Past Medical History:   Diagnosis Date    Heroin abuse ([facility] [address]     Hypertension    No past surgical history on file.   Barriers to Learning/Limitations: None  Compensate with: visual, verbal, tactile, kinesthetic cues/model    Home Situation:   Home Situation  Home Environment: Private residence  # Steps to Enter: 3  Rails to Enter: Yes  One/Two Story Residence: Two story  Living Alone: No  Support Systems: Family member(s)  Current DME Used/Available at Home: None  Tub or Shower Type: Tub/Shower combination  []     Right hand dominant   []     Left hand dominant    Cognitive/Behavioral Status:  Neurologic State: Alert  Orientation Level: Oriented X4  Cognition: Follows commands  Safety/Judgement: Awareness of environment    Skin: Intact  Edema: None noted    Vision/Perceptual:    Acuity: Within Defined Limits      Coordination: BUE  Fine Motor Skills-Upper: Left Intact; Right Intact    Gross Motor Skills-Upper: Left Intact; Right Intact    Balance:  Sitting: Intact  Standing: Intact    Strength: BUE  Strength: Generally decreased, functional    Tone & Sensation: BUE  Tone: Normal  Sensation: Intact     Range of Motion: BUE  AROM: Within functional limits       Functional Mobility and Transfers for ADLs:  Bed Mobility:  Supine to Sit: Independent  Sit to Supine: Independent  Scooting: Independent     Transfers:  Sit to Stand: Independent  Stand to Sit: Independent    ADL Assessment:  Feeding: Independent    Oral Facial Hygiene/Grooming: Independent    Bathing: Independent    Upper Body Dressing: Independent    Lower Body Dressing: Independent    Toileting: Independent     ADL Intervention:    Upper Body Dressing Assistance  Dressing Assistance: [id number] [facility] [id number]: Independent    Lower Body Dressing Assistance  Dressing Assistance: Independent  Socks: Independent  Leg Crossed Method Used: Yes  Position Performed: Seated edge of bed    Cognitive Retraining  Safety/Judgement: Awareness of environment      Pain:  Pain level pre-treatment: 0/10   Pain level post-treatment: 0/10   Pain Intervention(s): Medication (see MAR)  Response to intervention: Nurse notified, See doc flow    Activity Tolerance:   Good    Please refer to the flowsheet for vital signs taken during this treatment.   After treatment:   []  Patient left in no apparent distress sitting up in chair  [x]  Patient left in no apparent distress in bed  [x]  Call bell left within reach  [x]  Nursing notified  []  Caregiver present  [x] Bed alarm activated    COMMUNICATION/EDUCATION:   [x]      Role of Occupational Therapy in the acute care setting  [x]      Home safety education was provided and the patient/caregiver indicated understanding. [x]      Patient/family have participated as able and agree with findings and recommendations. []      Patient is unable to participate in plan of care at this time. Thank you for this referral.  Adore Carmona OTR/L  Time Calculation: 19 mins      Eval Complexity: History: MEDIUM Complexity : Expanded review of history including physical, cognitive and psychosocial  history ; Examination: LOW Complexity : 1-3 performance deficits relating to physical, cognitive , or psychosocial skils that result in activity limitations and / or participation restrictions ;    Decision Making:LOW Complexity : No comorbidities that affect functional and no verbal or physical assistance needed to complete eval tasks

## 2021-08-13 NOTE — H&P
History & Physical    Patient: Yisel Cardoso MRN: [id number]  CSN: [id number]    YOB: 1955  Age: 77 y.o. Sex: female      DOA: 2021    Chief Complaint:   Chief Complaint   Patient presents with    Urinary Pain          HPI:     Yisel Cardoso is a 77 y.o. AA female with hx of heroin use (abstinent for 8 years) and HTN who presented to [facility] ED today after experiencing several days of fatigue, lower abdominal pain, and a few instances of vomiting. Patient stated she felt like she was coming down with a urinary tract infection. She feels her stomach is a bit distended. No changes to appetite. No jaundice or icterus. While in the emergency department patient had an instance of SVT with heart rate at 190. SVT broke with adenosine. Potassium of 2.9 initially; repleted in ED. Past Medical History:   Diagnosis Date    Heroin abuse ([address]     Hypertension        No past surgical history on file. Family History   Problem Relation Age of Onset    Cancer Mother     Heart Disease Father     Hypertension Sister        Social History     Socioeconomic History    Marital status: SINGLE     Spouse name: Not on file    Number of children: Not on file    Years of education: Not on file    Highest education level: Not on file   Tobacco Use    Smoking status: Former Smoker     Packs/day: 0.30     Types: Cigarettes     Quit date: 3/26/2020     Years since quittin.3    Smokeless tobacco: Never Used   Substance and Sexual Activity    Alcohol use: Yes     Comment: occasional    Drug use: Not Currently     Types: Heroin     Comment: taking methadone stop Heroin about 8 years ago     Social Determinants of Health     Financial Resource Strain:     Difficulty of Paying Living Expenses:    Food Insecurity:     Worried About Running Out of Food in the Last Year:     Ran Out of Food in the Last Year:    Transportation Needs:     Lack of Transportation (Medical):      Lack of Transportation (Non-Medical):    Physical Activity:     Days of Exercise per Week:     Minutes of Exercise per Session:    Stress:     Feeling of Stress :    Social Connections:     Frequency of Communication with Friends and Family:     Frequency of Social Gatherings with Friends and Family:     Attends Mu-ism Services:     Active Member of Clubs or Organizations:     Attends Club or Organization Meetings:     Marital Status:        Prior to Admission medications    Medication Sig Start Date End Date Taking? Authorizing Provider   amLODIPine (NORVASC) 10 mg tablet Take 5 mg by mouth daily. Indications: high blood pressure   Yes Provider, Historical       No Known Allergies      Review of Systems  GENERAL: No fever, chills,+ malaise, no weight changes  HEENT: No sore throat or sinus congestion. No icterus. NECK: No pain or stiffness. PULMONARY: No shortness of breath, cough or wheeze. Cardiovascular: no CP  GASTROINTESTINAL: + lower abdominal pain, +nausea,+ vomiting,no  Diarrhea or constipation   GENITOURINARY: No urinary frequency, urgency, or dysuria. +dark urine   MUSCULOSKELETAL: no recent trauma. DERMATOLOGIC: No rash, no itching, no lesions. ENDOCRINE: No recent change in weight. HEMATOLOGICAL: No anemia or easy bruising or bleeding. NEUROLOGIC: No headache, no focal weakness. Physical Exam:     Physical Exam:  Visit Vitals  BP (!) 153/70 (BP 1 Location: Right upper arm, BP Patient Position: At rest)   Pulse 60   Temp 98.2 °F (36.8 °C)   Resp 18   Wt 53.1 kg (117 lb)   SpO2 100%   BMI 22.85 kg/m²      O2 Device: None    Temp (24hrs), Av.7 °F (36.5 °C), Min:97.2 °F (36.2 °C), Max:98.2 °F (36.8 °C)    No intake/output data recorded. No intake/output data recorded. General:  Alert, cooperative, no distress, appears stated age. Head: Normocephalic, without obvious abnormality, atraumatic. Eyes:  Conjunctivae/corneas clear. PERRL, EOMs intact.  Nonicteric    Nose: Nares normal. No drainage or sinus tenderness. Neck: Supple, symmetrical, trachea midline   Lungs:   Clear to auscultation bilaterally. Heart:  Regular rate and rhythm, S1, S2 normal.     Abdomen: Soft, +lower abdominal pain, no distension. No tympany. Extremities: No LE edema. Pulses: 2+ and symmetric all extremities. Skin:  No jaundice    Neurologic: AAOx3, No focal motor or sensory deficit. Labs Reviewed: All lab results for the last 24 hours reviewed. .ct     XR Results (most recent):  Results from Hospital Encounter encounter on 08/12/21    XR CHEST PORT    Narrative  EXAM: XR CHEST PORT    INDICATION: tachycardia    COMPARISON: 11/26/2020. FINDINGS: A single view of the chest demonstrates clear lungs. The cardiac and  mediastinal contours and pulmonary vascularity are normal. Similar aortic  atherosclerosis. Decreased bone mineral density. Osseous degenerative changes. Trace rotoscoliosis. .    Impression  No acute findings. .        CT Results  (Last 48 hours)               08/12/21 1352  CT ABD PELV W CONT Final result    Impression:      Extensive colonic diverticulosis without definite acute diverticulitis. Questionable wall thickening of the distal sigmoid colon without definite   adjacent inflammation. A mild segmental colitis is possible. Additionally, would   recommend ensuring that the patient is current on colonoscopy screening to   exclude underlying mass. Severe biliary dilatation of the common bile duct measuring up to 17 mm with   tapering at the level of the intrapancreatic CBD. Coarse calcifications in the   uncinate process of pancreas suggest chronic pancreatitis. Additionally, there   is a questionable 5 mm hypodense lesion in the pancreatic head which is   nonspecific given the suspected chronic pancreatitis. This may be an area of   fibrotic change, tiny cystic lesion, or early pancreatic adenocarcinoma.  The   biliary dilatation could be secondary to either benign stricturing from chronic   pancreatitis or biliary obstruction from early malignancy. Recommend abdominal   MRI without and with intravenous contrast for further assessment. Hepatic steatosis with morphologic changes of the liver. Narrative:  CT Abdomen And Pelvis with Intravenous Contrast       INDICATION: Generalized abdominal pain. TECHNIQUE: 5 mm collimation axial images obtained from the diaphragm to the   level of the pubic symphysis following the uneventful administration of  100 cc   of low osmolar, nonionic intravenous contrast.       Dose reduction techniques used: Automated exposure control, adjustment of the   mAs and/or kVp according to patient size, standardized low-dose protocol, and/or   iterative reconstruction technique. COMPARISON: None. ABDOMEN FINDINGS:       Lung Bases: Bibasilar atelectasis. Liver: Hepatic steatosis. Morphologic changes of the liver without suspicious   mass. Gallbladder: Present and appears normal. There is significant biliary dilatation   with the common bile duct measuring up to 1.7 cm. This extends to the level of   the pancreatic head. Pancreas: There are calcifications in the uncinate process of the pancreas. The   pancreatic duct is prominent, but not definitely dilated. . There is a small   focal hypodensity noted in the pancreatic head measuring approximately 5 mm on   image 36.       Spleen: Normal in size. No evidence of mass. .       Adrenal Glands: No evidence for mass. Kidneys:        Right: Normal enhancement. No cortical mass. No hydronephrosis. Left:  Normal enhancement. No cortical mass. No hydronephrosis. Lymph Nodes: No lymphadenopathy. Aorta:   Abdominal aorta is patent with atherosclerosis. Mixing artifact is seen   in the portal vein       PELVIS FINDINGS:        Bowel: Small Bowel: Normal in caliber with normal wall thickness. Large Bowel:  There is colonic diverticulosis without definite evidence of acute   diverticulitis. There is a prominent diverticulum arising from the posterior   upper rectum. There is some questionable wall thickening of the distal sigmoid   colon without adjacent inflammation   Appendix: Normal appendix. Bladder: Normal.       Uterus and adnexa are unremarkable. No ascites or free air. Bones: No acute finding. Grade 2 anterolisthesis of L5 on S1. Procedures/imaging: see electronic medical records for all procedures/Xrays and details which were not copied into this note but were reviewed prior to creation of Plan      Assessment/Plan     Active Problems:    Pancreatitis (8/12/2021)       Assessment  1. Abdominal Pain - CT shows severe biliary dilatation of the CBD; calcification possibly suggestive of chronic pancreatitis; questionable 5 mm lesion in pancreatic head. DDx to include cholangitis vs chronic pancreatitis vs early adenocarcinoma.    -Admit to medical floor. NPO. GI consulted by ED. MRCP ordered. 2. SVT, resolved  3. Hypokalemia   -#2,3: SVT was likely secondary to severe hypokalemia. Adenosine given. Currently in NSR. Potassium repleted; will continue to replete as needed. 4. Abnormal UA   -Patient treated for UTI approx 3 weeks ago. Urine culture from that encounter did not grow anything. Will repeat culture. Patient is experiencing some lower abdominal pain and darkened urine but no dysuria or flank pain. 5. Hypertension   -Recently started on amlodipine, will continue     For all above:   1. Monitor vital signs, weight per unit protocol  2. PT, OT eval and treat  3. Consult CM to assist w/ dc planning    Diet: NPO  DVT/GI Prophylaxis: Lovenox   Code Status: full     Contact: Mil Ni (sister) [phone]    Discussed with patient at bedside about hospital admission and plan care. He understands and agrees. All questions answered.       Disclaimer: Sections of this note are dictated using utilizing voice recognition software. Minor typographical errors may be present. If questions arise, please do not hesitate to contact me or call our department.         Joshua Zaragoza, [facility]  08/12/21  10:51 PM

## 2021-08-13 NOTE — ED NOTES
Patient ambulated to and from bathroom, steady gait noted. Will administer pain medication as ordered by provider.

## 2021-08-13 NOTE — PROGRESS NOTES
Bedside shift change report given to [facility] (oncoming nurse) by Maria Fernanda Lizama (offgoing nurse). Report included the following information SBAR, Kardex and Intake/Output.

## 2021-08-13 NOTE — ED NOTES
Patient was signed out to me by Dr. Glenn Jasso. The patient was found to have a probable pancreatic head mass causing ascending cholangitis. Patient was treated appropriately with antibiotics and fluids. The patient will likely need a cholecystostomy tube. I consulted gastroenterology, and they will evaluate the patient in the morning. In controlling patient's nausea and pain.   I have admitted the patient to the hospitalist.

## 2021-08-13 NOTE — ED NOTES
TRANSFER - OUT REPORT:    Verbal report given(name) on Ezequiel Homans  being transferred to 408(unit) for routine progression of care       Report consisted of patients Situation, Background, Assessment and   Recommendations(SBAR). Information from the following report(s) ED Summary was reviewed with the receiving nurse. Lines:   Peripheral IV 08/12/21 Anterior;Left;Proximal Forearm (Active)   Site Assessment Clean, dry, & intact 08/12/21 1000   Phlebitis Assessment 0 08/12/21 1000   Dressing Status Clean, dry, & intact 08/12/21 1000   Hub Color/Line Status Green 08/12/21 1000   Action Taken Blood drawn 08/12/21 1000       Peripheral IV 08/12/21 Left;Posterior Wrist (Active)   Site Assessment Clean, dry, & intact 08/12/21 1017   Phlebitis Assessment 0 08/12/21 1017   Dressing Status Clean, dry, & intact 08/12/21 1017   Dressing Type Transparent 08/12/21 1017   Hub Color/Line Status Green 08/12/21 1017   Action Taken Blood drawn 08/12/21 1017        Opportunity for questions and clarification was provided.       Patient transported with:   transport

## 2021-08-13 NOTE — ED NOTES
Patient transferred to room 19 from hallway B, assuming care of patient. Patient connected to cardiac monitor. Patient denies any new complaints at this time. Bed in lowest position, call bell within reach. No acute distress noted.

## 2021-08-13 NOTE — PROGRESS NOTES
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 Cholelithiasis. Recommendations:  IV antibiotics over the weekend given her elevated white count upon hospital arrival.   Will coordinate for outpatient ERCP w stenting and EUS with FNA with Dr Leif Dodd. Dr Florencia Rodriguez on call this weekend. Cristhian Umaña PA-C  [facility].  [email or web address]  Phone: [phone]

## 2021-08-13 NOTE — ED NOTES
Patient A/O x 4, sitting on stretcher. Patient complaining of abdominal pain with nausea. Will administer medication ordered by provider.

## 2021-08-13 NOTE — PROGRESS NOTES
PT order received and chart was reviewed. Pt is currently off the floor. Will continue to follow.  Ifrah Gonzalez PT

## 2021-08-14 LAB
BASOPHILS # BLD: 0 K/UL (ref 0–0.1)
BASOPHILS NFR BLD: 0 % (ref 0–2)
DIFFERENTIAL METHOD BLD: ABNORMAL
EOSINOPHIL # BLD: 0 K/UL (ref 0–0.4)
EOSINOPHIL NFR BLD: 0 % (ref 0–5)
ERYTHROCYTE [DISTWIDTH] IN BLOOD BY AUTOMATED COUNT: 12.3 % (ref 11.6–14.5)
HCT VFR BLD AUTO: 47.1 % (ref 35–45)
HCV AB S/CO SERPL IA: <0.1 S/CO RATIO (ref 0–0.9)
HCV AB SERPL QL IA: NORMAL
HGB BLD-MCNC: 15.9 G/DL (ref 12–16)
LYMPHOCYTES # BLD: 5.4 K/UL (ref 0.9–3.6)
LYMPHOCYTES NFR BLD: 40 % (ref 21–52)
MCH RBC QN AUTO: 33.3 PG (ref 24–34)
MCHC RBC AUTO-ENTMCNC: 33.8 G/DL (ref 31–37)
MCV RBC AUTO: 98.5 FL (ref 74–97)
MONOCYTES # BLD: 1.1 K/UL (ref 0.05–1.2)
MONOCYTES NFR BLD: 8 % (ref 3–10)
NEUTS SEG # BLD: 6.9 K/UL (ref 1.8–8)
NEUTS SEG NFR BLD: 52 % (ref 40–73)
PLATELET # BLD AUTO: 271 K/UL (ref 135–420)
PLATELET COMMENTS,PCOM: ABNORMAL
PMV BLD AUTO: 10.2 FL (ref 9.2–11.8)
RBC # BLD AUTO: 4.78 M/UL (ref 4.2–5.3)
RBC MORPH BLD: ABNORMAL
WBC # BLD AUTO: 13.4 K/UL (ref 4.6–13.2)

## 2021-08-14 PROCEDURE — 65270000029 HC RM PRIVATE

## 2021-08-14 PROCEDURE — 96376 TX/PRO/DX INJ SAME DRUG ADON: CPT

## 2021-08-14 PROCEDURE — 99232 SBSQ HOSP IP/OBS MODERATE 35: CPT | Performed by: INTERNAL MEDICINE

## 2021-08-14 PROCEDURE — 74011250636 HC RX REV CODE- 250/636: Performed by: INTERNAL MEDICINE

## 2021-08-14 PROCEDURE — 74011250636 HC RX REV CODE- 250/636: Performed by: STUDENT IN AN ORGANIZED HEALTH CARE EDUCATION/TRAINING PROGRAM

## 2021-08-14 PROCEDURE — 96365 THER/PROPH/DIAG IV INF INIT: CPT

## 2021-08-14 PROCEDURE — 85025 COMPLETE CBC W/AUTO DIFF WBC: CPT

## 2021-08-14 PROCEDURE — 99218 HC RM OBSERVATION: CPT

## 2021-08-14 PROCEDURE — 96372 THER/PROPH/DIAG INJ SC/IM: CPT

## 2021-08-14 PROCEDURE — 74011000250 HC RX REV CODE- 250: Performed by: INTERNAL MEDICINE

## 2021-08-14 PROCEDURE — 36415 COLL VENOUS BLD VENIPUNCTURE: CPT

## 2021-08-14 PROCEDURE — 74011250637 HC RX REV CODE- 250/637: Performed by: STUDENT IN AN ORGANIZED HEALTH CARE EDUCATION/TRAINING PROGRAM

## 2021-08-14 PROCEDURE — 2709999900 HC NON-CHARGEABLE SUPPLY

## 2021-08-14 RX ORDER — ONDANSETRON 2 MG/ML
8 INJECTION INTRAMUSCULAR; INTRAVENOUS ONCE
Status: COMPLETED | OUTPATIENT
Start: 2021-08-14 | End: 2021-08-14

## 2021-08-14 RX ORDER — ONDANSETRON 2 MG/ML
8 INJECTION INTRAMUSCULAR; INTRAVENOUS ONCE
Status: DISCONTINUED | OUTPATIENT
Start: 2021-08-14 | End: 2021-08-14 | Stop reason: SDUPTHER

## 2021-08-14 RX ADMIN — ONDANSETRON 8 MG: 2 INJECTION INTRAMUSCULAR; INTRAVENOUS at 06:46

## 2021-08-14 RX ADMIN — MORPHINE SULFATE 2 MG: 2 INJECTION, SOLUTION INTRAMUSCULAR; INTRAVENOUS at 06:11

## 2021-08-14 RX ADMIN — Medication 10 ML: at 17:47

## 2021-08-14 RX ADMIN — METRONIDAZOLE 500 MG: 500 INJECTION, SOLUTION INTRAVENOUS at 09:01

## 2021-08-14 RX ADMIN — Medication 10 ML: at 09:10

## 2021-08-14 RX ADMIN — ENOXAPARIN SODIUM 40 MG: 40 INJECTION SUBCUTANEOUS at 09:07

## 2021-08-14 RX ADMIN — MORPHINE SULFATE 2 MG: 2 INJECTION, SOLUTION INTRAMUSCULAR; INTRAVENOUS at 14:20

## 2021-08-14 RX ADMIN — CEFTRIAXONE SODIUM 2 G: 2 INJECTION, POWDER, FOR SOLUTION INTRAMUSCULAR; INTRAVENOUS at 03:51

## 2021-08-14 RX ADMIN — METOPROLOL TARTRATE 12.5 MG: 25 TABLET, FILM COATED ORAL at 09:03

## 2021-08-14 RX ADMIN — ONDANSETRON 4 MG: 2 INJECTION INTRAMUSCULAR; INTRAVENOUS at 14:25

## 2021-08-14 RX ADMIN — Medication 10 ML: at 22:43

## 2021-08-14 RX ADMIN — METOPROLOL TARTRATE 12.5 MG: 25 TABLET, FILM COATED ORAL at 22:42

## 2021-08-14 RX ADMIN — CEFTRIAXONE SODIUM 2 G: 2 INJECTION, POWDER, FOR SOLUTION INTRAMUSCULAR; INTRAVENOUS at 17:45

## 2021-08-14 RX ADMIN — MORPHINE SULFATE 2 MG: 2 INJECTION, SOLUTION INTRAMUSCULAR; INTRAVENOUS at 01:04

## 2021-08-14 RX ADMIN — ONDANSETRON 4 MG: 2 INJECTION INTRAMUSCULAR; INTRAVENOUS at 03:50

## 2021-08-14 RX ADMIN — AMLODIPINE BESYLATE 5 MG: 5 TABLET ORAL at 09:03

## 2021-08-14 RX ADMIN — METRONIDAZOLE 500 MG: 500 INJECTION, SOLUTION INTRAVENOUS at 17:46

## 2021-08-14 RX ADMIN — METRONIDAZOLE 500 MG: 500 INJECTION, SOLUTION INTRAVENOUS at 01:06

## 2021-08-14 NOTE — PROGRESS NOTES
Bedside and Verbal shift change report given to [address] (oncoming nurse) by Ryan Harris RN   (offgoing nurse). Report given with SBAR, Kardex, MAR and Recent Results.

## 2021-08-14 NOTE — PROGRESS NOTES
Bedside and Verbal shift change report given to Morenita (oncoming nurse) by Ricardo Anderson (offgoing nurse). Report included the following information SBAR and Kardex.

## 2021-08-14 NOTE — PROGRESS NOTES
conducted an initial consultation and Spiritual Assessment for [facility] ([facility]), who is a 77 y.o.,female. Patients Primary Language is: [place]. According to the patients EMR Yarsanism Affiliation is: [facility].     The reason the Patient came to the hospital is:   Patient Active Problem List    Diagnosis Date Noted    Pancreatitis 08/12/2021    Epistaxis 03/27/2015    HTN (hypertension) 07/10/2013        The  provided the following Interventions:  Initiated a relationship of care and support. Explored issues of sofía, belief, spirituality and Taoist/ritual needs while hospitalized. Listened empathically. Offered prayer and assurance of continued prayers on patient's behalf. Chart reviewed. The following outcomes where achieved:  Patient shared limited information about both their medical narrative and spiritual journey/beliefs. Patient processed feeling about current hospitalization. Patient expressed gratitude for 's visit. Assessment:  Patient does not have any Taoist/cultural needs that will affect patients preferences in health care. There are no spiritual or Taoist issues which require intervention at this time. Plan:  Chaplains will continue to follow and will provide pastoral care on an as needed/requested basis.  recommends bedside caregivers page  on duty if patient shows signs of acute spiritual or emotional distress.       [facility] [place]   [phone]

## 2021-08-14 NOTE — PROGRESS NOTES
[facility]  Progress Note    Patient: Kelly Steward Age: 77 y.o. : 1955 MR#: [id number] [id number]: [id number]  Date/Time: 2021     C/C: Abdominal pain      Subjective:   HPI : Patient with history of heroine use-patient snorts heroine does not use IVDA-admitted with abdominal pain abnormal gallbladder and liver imaging-leukocytosis-plan for IV antibiotic for 48 hours with outpatient ERCP and fine-needle aspiration biopsy, arrangements will be done by GI. There help appreciated          Review of Systems:  positive responses in bold type   Constitutional: Negative for fever, chills, diaphoresis and unexpected weight change. HENT: Negative for ear pain, congestion, sore throat, rhinorrhea, drooling, trouble swallowing, neck pain and tinnitus. Eyes: Negative for photophobia, pain, redness and visual disturbance. Respiratory: negative for shortness of breath, cough, choking, chest tightness, wheezing or stridor. Cardiovascular: Negative for chest pain, palpitations and leg swelling. Gastrointestinal: Some nausea but patient ate her meal partially still complaining of abdominal discomfort no distress  Genitourinary: Negative for dysuria, urgency, frequency, hematuria, flank pain and difficulty urinating. Musculoskeletal: Negative for back pain and arthralgias. Skin: Negative for color change, rash and wound. Neurological: Negative for dizziness, seizures, syncope, speech difficulty, light-headedness or headaches. Hematological: Does not bruise/bleed easily. Psychiatric/Behavioral: Negative for suicidal ideas, hallucinations, behavioral problems, self-injury or agitation     Assessment/Plan:     1. Abdominal pain left lower quadrant  ? Colitis  ? Pancreatic head lesion  ? Biliary infection  = Plan is to follow-up with GI/continue antibiotic/outpatient ERCP/EUS and biopsy.   Per GI recommendation    2 heroin use-counseled till her understanding to stop use of heroine      Time spent on direct patient care >30 mints     Complexity : High complex - due to multiple medical issues outlined above. CODE Status : Full code    Case discussed with:  [x]Patient  [] Family  []Nursing  []Case Management     DVT Prophylaxis:  [x]Lovenox  []Hep SQ  []SCDs  []Coumadin   []On Heparin gtt      Objective:   VS:   Visit Vitals  BP (!) 186/96   Pulse 74   Temp 98 °F (36.7 °C)   Resp 20   Ht 5' (1.524 m)   Wt 54 kg (119 lb)   SpO2 95%   Breastfeeding No   BMI 23.24 kg/m²      Tmax/24hrs: Temp (24hrs), Av.3 °F (36.8 °C), Min:98 °F (36.7 °C), Max:98.5 °F (36.9 °C)  IOBRIEF    Intake/Output Summary (Last 24 hours) at 2021 1216  Last data filed at 2021 0408  Gross per 24 hour   Intake 340 ml   Output 400 ml   Net -60 ml       General:  Alert, cooperative, no acute distress   HEENT: No facial asymmetry, MARY Alber, External ears - WNL    Cardiovascular: S1S2 - regular , No Murmur   Pulmonary: Equal expansion , No Use of accessory muscles , No Rales No Rhonchi    GI:  +BS in all four quadrants, soft, non-tender  Extremities:  No edema; 2+ dorsalis pedis pulses bilaterally  Neuro: Alert and oriented X 2.        Medications:   Current Facility-Administered Medications   Medication Dose Route Frequency    oxyCODONE-acetaminophen (PERCOCET) 5-325 mg per tablet 1 Tablet  1 Tablet Oral Q6H PRN    morphine injection 2 mg  2 mg IntraVENous Q4H PRN    cefTRIAXone (ROCEPHIN) 2 g in sterile water (preservative free) 20 mL IV syringe  2 g IntraVENous Q12H    metroNIDAZOLE (FLAGYL) IVPB premix 500 mg  500 mg IntraVENous Q8H    metoprolol tartrate (LOPRESSOR) tablet 12.5 mg  12.5 mg Oral Q12H    amLODIPine (NORVASC) tablet 5 mg  5 mg Oral DAILY    sodium chloride (NS) flush 5-40 mL  5-40 mL IntraVENous Q8H    sodium chloride (NS) flush 5-40 mL  5-40 mL IntraVENous PRN    acetaminophen (TYLENOL) tablet 650 mg  650 mg Oral Q6H PRN    Or    acetaminophen (TYLENOL) suppository 650 mg  650 mg Rectal Q6H PRN    polyethylene glycol (MIRALAX) packet 17 g  17 g Oral DAILY PRN    ondansetron (ZOFRAN ODT) tablet 4 mg  4 mg Oral Q8H PRN    Or    ondansetron (ZOFRAN) injection 4 mg  4 mg IntraVENous Q6H PRN    enoxaparin (LOVENOX) injection 40 mg  40 mg SubCUTAneous DAILY       Labs:    Recent Labs     08/14/21  0358 08/13/21  0450 08/12/21  1000   WBC 13.4* 12.8 14.2*   HGB 15.9 15.6 17.9*   HCT 47.1* 48.5* 52.9*    262 323     Recent Labs     08/13/21  0450 08/12/21  1000    139   K 4.1 2.9*    99*   CO2 32 29   GLU 94 169*   BUN 11 8   CREA 0.71 1.16   CA 10.0 10.4*   MG  --  1.9   ALB 4.3 4.9   ALT 30 35         Disclaimer: Sections of this note are dictated utilizing voice recognition software, which may have resulted in some phonetic based errors in grammar and contents. Even though attempts were made to correct all the mistakes, some may have been missed, and remained in the body of the document. If questions arise, please contact our department.     Signed By: Jagjit Daniels MD     August 14, 2021

## 2021-08-14 NOTE — PROGRESS NOTES
Patient complaints of poor appetite but no fever. No pain or itching. No bleeding. No nausea or vomiting. PE:   Visit Vitals  /63 (BP 1 Location: Right arm, BP Patient Position: Sitting)   Pulse 62   Temp 99.1 °F (37.3 °C)   Resp 20   Ht 5' (1.524 m)   Wt 54 kg (119 lb)   SpO2 98%   Breastfeeding No   BMI 23.24 kg/m²     Abdomen soft BS present  Heart sounds normal.  Recent Results (from the past 24 hour(s))   CBC WITH AUTOMATED DIFF    Collection Time: 08/14/21  3:58 AM   Result Value Ref Range    WBC 13.4 (H) 4.6 - 13.2 K/uL    RBC 4.78 4.20 - 5.30 M/uL    HGB 15.9 12.0 - 16.0 g/dL    HCT 47.1 (H) 35.0 - 45.0 %    MCV 98.5 (H) 74.0 - 97.0 FL    MCH 33.3 24.0 - 34.0 PG    MCHC 33.8 31.0 - 37.0 g/dL    RDW 12.3 11.6 - 14.5 %    PLATELET 344 975 - 218 K/uL    MPV 10.2 9.2 - 11.8 FL    NEUTROPHILS 52 40 - 73 %    LYMPHOCYTES 40 21 - 52 %    MONOCYTES 8 3 - 10 %    EOSINOPHILS 0 0 - 5 %    BASOPHILS 0 0 - 2 %    ABS. NEUTROPHILS 6.9 1.8 - 8.0 K/UL    ABS. LYMPHOCYTES 5.4 (H) 0.9 - 3.6 K/UL    ABS. MONOCYTES 1.1 0.05 - 1.2 K/UL    ABS. EOSINOPHILS 0.0 0.0 - 0.4 K/UL    ABS. BASOPHILS 0.0 0.0 - 0.1 K/UL    DF MANUAL      PLATELET COMMENTS ADEQUATE PLATELETS      RBC COMMENTS NORMOCYTIC, NORMOCHROMIC     A/P Suspected cholangitis due to cbd stricture on IV abx. No fever but still has high white count and poor appetite. ERCP will be needed for the cbd stricture. Chronic pancreatitis with calcification. Pancreatic head lesion. Diverticulosis.     Avis Cox MD

## 2021-08-14 NOTE — ROUTINE PROCESS
Pt complaining of nausea even after Zofran given. Dr Germaine Agudelo notified, orders received. Zofran 8 mg given IV

## 2021-08-15 VITALS
SYSTOLIC BLOOD PRESSURE: 113 MMHG | RESPIRATION RATE: 18 BRPM | HEIGHT: 60 IN | HEART RATE: 88 BPM | TEMPERATURE: 98.5 F | WEIGHT: 119 LBS | BODY MASS INDEX: 23.36 KG/M2 | OXYGEN SATURATION: 95 % | DIASTOLIC BLOOD PRESSURE: 71 MMHG

## 2021-08-15 PROCEDURE — 74011250636 HC RX REV CODE- 250/636: Performed by: STUDENT IN AN ORGANIZED HEALTH CARE EDUCATION/TRAINING PROGRAM

## 2021-08-15 PROCEDURE — 96376 TX/PRO/DX INJ SAME DRUG ADON: CPT

## 2021-08-15 PROCEDURE — 99218 HC RM OBSERVATION: CPT

## 2021-08-15 PROCEDURE — 99239 HOSP IP/OBS DSCHRG MGMT >30: CPT | Performed by: INTERNAL MEDICINE

## 2021-08-15 PROCEDURE — 96366 THER/PROPH/DIAG IV INF ADDON: CPT

## 2021-08-15 PROCEDURE — 74011250637 HC RX REV CODE- 250/637: Performed by: STUDENT IN AN ORGANIZED HEALTH CARE EDUCATION/TRAINING PROGRAM

## 2021-08-15 PROCEDURE — 74011000250 HC RX REV CODE- 250: Performed by: INTERNAL MEDICINE

## 2021-08-15 PROCEDURE — 74011250636 HC RX REV CODE- 250/636: Performed by: INTERNAL MEDICINE

## 2021-08-15 RX ORDER — CIPROFLOXACIN 500 MG/1
500 TABLET ORAL 2 TIMES DAILY
Qty: 10 TABLET | Refills: 0 | Status: SHIPPED | OUTPATIENT
Start: 2021-08-15

## 2021-08-15 RX ORDER — POLYETHYLENE GLYCOL 3350 17 G/17G
17 POWDER, FOR SOLUTION ORAL DAILY
Qty: 30 EACH | Refills: 0 | OUTPATIENT
Start: 2021-08-15 | End: 2021-08-24

## 2021-08-15 RX ORDER — METRONIDAZOLE 250 MG/1
250 TABLET ORAL 3 TIMES DAILY
Qty: 30 TABLET | Refills: 0 | Status: SHIPPED | OUTPATIENT
Start: 2021-08-15

## 2021-08-15 RX ORDER — METOPROLOL TARTRATE 25 MG/1
12.5 TABLET, FILM COATED ORAL EVERY 12 HOURS
Qty: 60 TABLET | Refills: 0 | Status: SHIPPED | OUTPATIENT
Start: 2021-08-15

## 2021-08-15 RX ADMIN — CEFTRIAXONE SODIUM 2 G: 2 INJECTION, POWDER, FOR SOLUTION INTRAMUSCULAR; INTRAVENOUS at 06:25

## 2021-08-15 RX ADMIN — METRONIDAZOLE 500 MG: 500 INJECTION, SOLUTION INTRAVENOUS at 08:17

## 2021-08-15 RX ADMIN — Medication 10 ML: at 06:26

## 2021-08-15 RX ADMIN — METOPROLOL TARTRATE 12.5 MG: 25 TABLET, FILM COATED ORAL at 08:19

## 2021-08-15 RX ADMIN — AMLODIPINE BESYLATE 5 MG: 5 TABLET ORAL at 08:18

## 2021-08-15 RX ADMIN — METRONIDAZOLE 500 MG: 500 INJECTION, SOLUTION INTRAVENOUS at 01:47

## 2021-08-15 RX ADMIN — ENOXAPARIN SODIUM 40 MG: 40 INJECTION SUBCUTANEOUS at 08:20

## 2021-08-15 NOTE — DISCHARGE INSTRUCTIONS
Patient armband removed and shredded  MyChart Activation    Thank you for requesting access to [employer]. Please follow the instructions below to securely access and download your online medical record. [employer] allows you to send messages to your doctor, view your test results, renew your prescriptions, schedule appointments, and more. How Do I Sign Up? 1. In your internet browser, go to [email or web address]  2. Click on the First Time User? Click Here link in the Sign In box. You will be redirect to the New Member Sign Up page. 3. Enter your [employer] Access Code exactly as it appears below. You will not need to use this code after youve completed the sign-up process. If you do not sign up before the expiration date, you must request a new code. [employer] Access Code: [email or web address]  Expires: 2021  7:38 AM (This is the date your [employer] access code will )    4. Enter the last four digits of your Social Security Number (xxxx) and Date of Birth (mm/dd/yyyy) as indicated and click Submit. You will be taken to the next sign-up page. 5. Create a [employer] ID. This will be your [employer] login ID and cannot be changed, so think of one that is secure and easy to remember. 6. Create a [employer] password. You can change your password at any time. 7. Enter your Password Reset Question and Answer. This can be used at a later time if you forget your password. 8. Enter your e-mail address. You will receive e-mail notification when new information is available in [address]. 9. Click Sign Up. You can now view and download portions of your medical record. 10. Click the Download Summary menu link to download a portable copy of your medical information. Additional Information    If you have questions, please visit the Frequently Asked Questions section of the [employer] website at [email or web address] Remember, [employer] is NOT to be used for urgent needs.  For medical emergencies, dial 911.        DISCHARGE SUMMARY from Nurse    PATIENT INSTRUCTIONS    What to do at Home:  Recommended activity: Activity as tolerated,     If you experience any of the following symptoms chest pain, shortness of breath, fever, chills, elevated temperature greater than 100.9 F,excessive nausea or vomiting  please follow up with nearest Emergency room. *  Please give a list of your current medications to your Primary Care Provider. *  Please update this list whenever your medications are discontinued, doses are      changed, or new medications (including over-the-counter products) are added. *  Please carry medication information at all times in case of emergency situations. These are general instructions for a healthy lifestyle:    No smoking/ No tobacco products/ Avoid exposure to second hand smoke  Surgeon General's Warning:  Quitting smoking now greatly reduces serious risk to your health. Obesity, smoking, and sedentary lifestyle greatly increases your risk for illness    A healthy diet, regular physical exercise & weight monitoring are important for maintaining a healthy lifestyle    You may be retaining fluid if you have a history of heart failure or if you experience any of the following symptoms:  Weight gain of 3 pounds or more overnight or 5 pounds in a week, increased swelling in our hands or feet or shortness of breath while lying flat in bed. Please call your doctor as soon as you notice any of these symptoms; do not wait until your next office visit. The discharge information has been reviewed with the patient. The patient verbalized understanding. Discharge medications reviewed with the patient and appropriate educational materials and side effects teaching were provided.   ___________________________________________________________________________________________________________________________________

## 2021-08-15 NOTE — PROGRESS NOTES
Feels well. Eating better. Had BM yesterday. No pain now. No fever. PE:   Visit Vitals  /81 (BP 1 Location: Right arm, BP Patient Position: At rest)   Pulse 66   Temp 97.8 °F (36.6 °C)   Resp 18   Ht 5' (1.524 m)   Wt 54 kg (119 lb)   SpO2 94%   Breastfeeding No   BMI 23.24 kg/m²     Abdomen soft non tender no peritoneal signs  Lungs CTA  No results found for this or any previous visit (from the past 12 hour(s)). A/P: Possible cholangitis due to distal cbd stricture related to chronic pancreatitis- ERCP as out patient. Can be switched to oral abx. Will need to be referred to Dr. Jah Che for ERCP- we will arrange.     Mitra Denton MD

## 2021-08-15 NOTE — PROGRESS NOTES
Bedside shift change report given to Morenita POLANCO (oncoming nurse) by Mark Muniz (offgoing nurse). Report included the following information SBAR and MAR.

## 2021-08-15 NOTE — DISCHARGE SUMMARY
Discharge Summary     Patient ID:  Jorge Zuleta  [id number]  77 y.o.  1955  Body mass index is 23.24 kg/m². PCP on record: None    Admit date: 8/12/2021  Discharge date and time: 8/8/2021, 1:34 PM     Discharge Diagnoses:                                           1 abdominal pain left lower quadrant  2 colitis  3 pancreatic head lesion  4 biliary infection  5 history of heroin use  6 hypertension    Consults: GI          Hospital Course by problems:    -Admitted with abdominal pain/CT suggestive of either biliary tract infection, colitis, also incidental finding of head of pancreas mass/lesion  -GI consulted-advised antibiotic treatment for biliary infection  -GI recommended outpatient follow-up for pancreatic mass where outpatient follow-up with Dr. Vini Proctor for EBUS on needle biopsy    GI note for reference: A/P: Possible cholangitis due to distal cbd stricture related to chronic pancreatitis- ERCP as out patient. Can be switched to oral abx. Will need to be referred to Dr. Jeremy Avitia for ERCP- we will arrange.  Rupesh Mullins MD        Patient seen and examined by me on discharge day. Pertinent Findings:  Stable  Significant Diagnostic Studies:  Results  CT ABD PELV W CONT (Accession [id number]) (Order [id number])  Allergies     No Known Allergies   Exam Information    Status Exam Begun  Exam Ended    Final [99] 8/12/2021 13:45 8/12/2021  1:52 PM [id number]  1:52 PM   Result Information    Status: Final result (Exam End: 8/12/2021 13:52) Provider Status: Open   Study Result    Narrative & Impression   CT Abdomen And Pelvis with Intravenous Contrast     INDICATION: Generalized abdominal pain.     TECHNIQUE: 5 mm collimation axial images obtained from the diaphragm to the  level of the pubic symphysis following the uneventful administration of  100 cc  of low osmolar, nonionic intravenous contrast.     Dose reduction techniques used:  Automated exposure control, adjustment of the  mAs and/or kVp according to patient size, standardized low-dose protocol, and/or  iterative reconstruction technique.     COMPARISON: None. ABDOMEN FINDINGS:     Lung Bases: Bibasilar atelectasis.     Liver: Hepatic steatosis. Morphologic changes of the liver without suspicious  mass.     Gallbladder: Present and appears normal. There is significant biliary dilatation  with the common bile duct measuring up to 1.7 cm. This extends to the level of  the pancreatic head.     Pancreas: There are calcifications in the uncinate process of the pancreas. The  pancreatic duct is prominent, but not definitely dilated. . There is a small  focal hypodensity noted in the pancreatic head measuring approximately 5 mm on  image 36.     Spleen: Normal in size. No evidence of mass. .     Adrenal Glands: No evidence for mass.     Kidneys:      Right: Normal enhancement. No cortical mass. No hydronephrosis.     Left:  Normal enhancement. No cortical mass. No hydronephrosis.     Lymph Nodes: No lymphadenopathy.     Aorta:   Abdominal aorta is patent with atherosclerosis. Mixing artifact is seen  in the portal vein     PELVIS FINDINGS:      Bowel: Small Bowel: Normal in caliber with normal wall thickness. Large Bowel: There is colonic diverticulosis without definite evidence of acute  diverticulitis. There is a prominent diverticulum arising from the posterior  upper rectum. There is some questionable wall thickening of the distal sigmoid  colon without adjacent inflammation  Appendix: Normal appendix.     Bladder: Normal.     Uterus and adnexa are unremarkable.     No ascites or free air.     Bones: No acute finding. Grade 2 anterolisthesis of L5 on S1.     IMPRESSION     Extensive colonic diverticulosis without definite acute diverticulitis.     Questionable wall thickening of the distal sigmoid colon without definite  adjacent inflammation. A mild segmental colitis is possible.  Additionally, would  recommend ensuring that the patient is current on colonoscopy screening to  exclude underlying mass.     Severe biliary dilatation of the common bile duct measuring up to 17 mm with  tapering at the level of the intrapancreatic CBD. Coarse calcifications in the  uncinate process of pancreas suggest chronic pancreatitis. Additionally, there  is a questionable 5 mm hypodense lesion in the pancreatic head which is  nonspecific given the suspected chronic pancreatitis. This may be an area of  fibrotic change, tiny cystic lesion, or early pancreatic adenocarcinoma. The  biliary dilatation could be secondary to either benign stricturing from chronic  pancreatitis or biliary obstruction from early malignancy. Recommend abdominal  MRI without and with intravenous contrast for further assessment.     Hepatic steatosis with morphologic changes of the liver. Pertinent Lab Data:  Recent Labs     08/14/21  0358 08/13/21  0450   WBC 13.4* 12.8   HGB 15.9 15.6   HCT 47.1* 48.5*    262     Recent Labs     08/13/21  0450      K 4.1      CO2 32   GLU 94   BUN 11   CREA 0.71   CA 10.0   ALB 4.3   ALT 30       DISCHARGE MEDICATIONS:   @  Current Discharge Medication List      START taking these medications    Details   ciprofloxacin HCl (Cipro) 500 mg tablet Take 1 Tablet by mouth two (2) times a day. Qty: 10 Tablet, Refills: 0  Start date: 8/15/2021      metroNIDAZOLE (FlagyL) 250 mg tablet Take 1 Tablet by mouth three (3) times daily. Qty: 30 Tablet, Refills: 0  Start date: 8/15/2021      metoprolol tartrate (LOPRESSOR) 25 mg tablet Take 0.5 Tablets by mouth every twelve (12) hours. Qty: 60 Tablet, Refills: 0  Start date: 8/15/2021      polyethylene glycol (MIRALAX) 17 gram packet Take 1 Packet by mouth daily. Qty: 30 Each, Refills: 0  Start date: 8/15/2021         CONTINUE these medications which have NOT CHANGED    Details   amLODIPine (NORVASC) 10 mg tablet Take 5 mg by mouth daily.  Indications: high blood pressure               My Recommended Diet, Activity, Wound Care, and follow-up labs are listed in the patient's Discharge Insturctions which I have personally completed and reviewed. Disposition:     [x] Home with family     [] [facility] [place] PT/RN   [] SNF/NH   [] Inpatient Rehab/[facility]  Condition at Discharge:  Stable    Follow up with:   PCP : None      Please follow-up tests/labs that are still pendin. None  2.    >30 minutes spent coordinating this discharge (review instructions/follow-up, prescriptions, preparing report for sign off)    Disclaimer: Sections of this note are dictated utilizing voice recognition software, which may have resulted in some phonetic based errors in grammar and contents. Even though attempts were made to correct all the mistakes, some may have been missed, and remained in the body of the document. If questions arise, please contact our department.     Signed:  Maci Kate MD

## 2021-08-15 NOTE — PROGRESS NOTES
Discharge instructions given verbally and written all questions answered IV and armband discontinued awaiting sister to transport home via car, prescriptions called into [address] . As part of the discharge instructions, medications already given today were discussed with the patient. The next dose due of all ordered meds was highlighted as part of the medication discharge instructions. Discussed with the patient the importance of taking medications as directed, as well as the side effects and adverse reactions to medications ordered.

## 2021-08-15 NOTE — PROGRESS NOTES
Discharge order noted for today. Orders reviewed. No needs identified at this time.  remains available if needed.     Derrick Forbes RN BSN  Care Manager  [phone]

## 2021-08-15 NOTE — PROGRESS NOTES
[facility]    Patient: Avelina Nyhan Age: 77 y.o. : 1955 MR#: [id number] SSN: [id number]  Date/Time: 8/15/2021     C/C: Abdominal pain      Subjective:   HPI : Patient with history of heroine use-patient snorts heroine does not use IVDA-admitted with abdominal pain abnormal gallbladder and liver imaging-leukocytosis-plan for IV antibiotic for 48 hours with outpatient ERCP and fine-needle aspiration biopsy, arrangements will be done by GI. There help appreciated          Review of Systems:  positive responses in bold type   Constitutional: Negative for fever, chills, diaphoresis and unexpected weight change. HENT: Negative for ear pain, congestion, sore throat, rhinorrhea, drooling, trouble swallowing, neck pain and tinnitus. Eyes: Negative for photophobia, pain, redness and visual disturbance. Respiratory: negative for shortness of breath, cough, choking, chest tightness, wheezing or stridor. Cardiovascular: Negative for chest pain, palpitations and leg swelling. Gastrointestinal: Some nausea but patient ate her meal partially still complaining of abdominal discomfort no distress  Genitourinary: Negative for dysuria, urgency, frequency, hematuria, flank pain and difficulty urinating. Musculoskeletal: Negative for back pain and arthralgias. Skin: Negative for color change, rash and wound. Neurological: Negative for dizziness, seizures, syncope, speech difficulty, light-headedness or headaches. Hematological: Does not bruise/bleed easily. Psychiatric/Behavioral: Negative for suicidal ideas, hallucinations, behavioral problems, self-injury or agitation     Assessment/Plan:     1. Abdominal pain left lower quadrant  ? Colitis  ? Pancreatic head lesion  ? Biliary infection  = Plan is to follow-up with GI/continue antibiotic/outpatient ERCP/EUS and biopsy.   Per GI recommendation  Change to po antibiotics - Cipro and dc home with out patient follow-up with GI      2 heroin use-counseled till her understanding to stop use of heroine      Time spent on direct patient care >30 mints     Complexity : High complex - due to multiple medical issues outlined above. CODE Status : Full code    Case discussed with:  [x]Patient  [] Family  []Nursing  []Case Management     DVT Prophylaxis:  [x]Lovenox  []Hep SQ  []SCDs  []Coumadin   []On Heparin gtt      Objective:   VS:   Visit Vitals  /71 (BP 1 Location: Right upper arm, BP Patient Position: At rest)   Pulse 88   Temp 98.5 °F (36.9 °C)   Resp 18   Ht 5' (1.524 m)   Wt 54 kg (119 lb)   SpO2 95%   Breastfeeding No   BMI 23.24 kg/m²      Tmax/24hrs: Temp (24hrs), Av.2 °F (36.8 °C), Min:97.8 °F (36.6 °C), Max:98.5 °F (36.9 °C)  IOBRIEF    Intake/Output Summary (Last 24 hours) at 8/15/2021 1332  Last data filed at 8/15/2021 1312  Gross per 24 hour   Intake 461 ml   Output    Net 461 ml       General:  Alert, cooperative, no acute distress   HEENT: No facial asymmetry, MARY Alber, External ears - WNL    Cardiovascular: S1S2 - regular , No Murmur   Pulmonary: Equal expansion , No Use of accessory muscles , No Rales No Rhonchi    GI:  +BS in all four quadrants, soft, non-tender  Extremities:  No edema; 2+ dorsalis pedis pulses bilaterally  Neuro: Alert and oriented X 2.        Medications:   Current Facility-Administered Medications   Medication Dose Route Frequency    oxyCODONE-acetaminophen (PERCOCET) 5-325 mg per tablet 1 Tablet  1 Tablet Oral Q6H PRN    morphine injection 2 mg  2 mg IntraVENous Q4H PRN    cefTRIAXone (ROCEPHIN) 2 g in sterile water (preservative free) 20 mL IV syringe  2 g IntraVENous Q12H    metroNIDAZOLE (FLAGYL) IVPB premix 500 mg  500 mg IntraVENous Q8H    metoprolol tartrate (LOPRESSOR) tablet 12.5 mg  12.5 mg Oral Q12H    amLODIPine (NORVASC) tablet 5 mg  5 mg Oral DAILY    sodium chloride (NS) flush 5-40 mL  5-40 mL IntraVENous Q8H    sodium chloride (NS) flush 5-40 mL  5-40 mL IntraVENous PRN    acetaminophen (TYLENOL) tablet 650 mg  650 mg Oral Q6H PRN    Or    acetaminophen (TYLENOL) suppository 650 mg  650 mg Rectal Q6H PRN    polyethylene glycol (MIRALAX) packet 17 g  17 g Oral DAILY PRN    ondansetron (ZOFRAN ODT) tablet 4 mg  4 mg Oral Q8H PRN    Or    ondansetron (ZOFRAN) injection 4 mg  4 mg IntraVENous Q6H PRN    enoxaparin (LOVENOX) injection 40 mg  40 mg SubCUTAneous DAILY       Labs:    Recent Labs     08/14/21  0358 08/13/21  0450   WBC 13.4* 12.8   HGB 15.9 15.6   HCT 47.1* 48.5*    262     Recent Labs     08/13/21  0450      K 4.1      CO2 32   GLU 94   BUN 11   CREA 0.71   CA 10.0   ALB 4.3   ALT 30         Disclaimer: Sections of this note are dictated utilizing voice recognition software, which may have resulted in some phonetic based errors in grammar and contents. Even though attempts were made to correct all the mistakes, some may have been missed, and remained in the body of the document. If questions arise, please contact our department.     Signed By: Gokul Alexander MD     August 15, 2021

## 2021-08-15 NOTE — PROGRESS NOTES
Problem: Falls - Risk of  Goal: *Absence of Falls  Description: Document Forest White Fall Risk and appropriate interventions in the flowsheet.   Outcome: Progressing Towards Goal  Note: Fall Risk Interventions:            Medication Interventions: Teach patient to arise slowly                   Problem: Patient Education: Go to Patient Education Activity  Goal: Patient/Family Education  Outcome: Progressing Towards Goal     Problem: Pain  Goal: *Control of Pain  Outcome: Progressing Towards Goal     Problem: Patient Education: Go to Patient Education Activity  Goal: Patient/Family Education  Outcome: Progressing Towards Goal     Problem: Patient Education: Go to Patient Education Activity  Goal: Patient/Family Education  Outcome: Progressing Towards Goal

## 2021-08-15 NOTE — PROGRESS NOTES
Problem: Falls - Risk of  Goal: *Absence of Falls  Description: Document Jono Vick Fall Risk and appropriate interventions in the flowsheet.   8/15/2021 0751 by Angie Page RN  Outcome: Progressing Towards Goal  Note: Fall Risk Interventions:    Medication Interventions: Evaluate medications/consider consulting pharmacy, Teach patient to arise slowly  8/15/2021 0751 by Angie Page RN  Outcome: Progressing Towards Goal  Note: Fall Risk Interventions:    Medication Interventions: Evaluate medications/consider consulting pharmacy, Teach patient to arise slowly         Problem: Pain  Goal: *Control of Pain  8/15/2021 0751 by Angie Page RN  Outcome: Progressing Towards Goal  8/15/2021 0751 by Angie Page RN  Outcome: Progressing Towards Goal

## 2021-08-16 LAB
BACTERIA SPEC CULT: ABNORMAL
CC UR VC: ABNORMAL
HAV AB SER QL IA: NEGATIVE
HBV CORE AB SERPL QL IA: POSITIVE
HBV SURFACE AB SER QL IA: POSITIVE
HBV SURFACE AB SERPL IA-ACNC: 271.61 MIU/ML
HBV SURFACE AG SER QL: <0.1 INDEX
HBV SURFACE AG SER QL: NEGATIVE
HEP BS AB COMMENT,HBSAC: NORMAL
HIV 1+2 AB+HIV1 P24 AG SERPL QL IA: NONREACTIVE
HIV12 RESULT COMMENT, HHIVC: NORMAL
SERVICE CMNT-IMP: ABNORMAL

## 2021-08-18 LAB
BACTERIA SPEC CULT: NORMAL
BACTERIA SPEC CULT: NORMAL
SERVICE CMNT-IMP: NORMAL
SERVICE CMNT-IMP: NORMAL

## 2021-08-19 LAB — CANCER AG19-9 SERPL-ACNC: NORMAL U/ML

## 2021-08-19 NOTE — ADT AUTH CERT NOTES
Utilization Reviews       Additional Clinical Requested- 8/14 by Jannette Johnson, LINDA       Review Status Review Entered   In Primary 8/19/2021 10:48      Criteria Review   8/14     RN NOTE:  Pt complaining of nausea even after Zofran given.  Dr Ruano notified, orders received. Zofran 8 mg given IV     Hospitalist-  Gastrointestinal: Some nausea but patient ate her meal partially still complaining of abdominal discomfort no distress     Visit Vitals  BP (!) 186/96   Pulse 74   Temp 98 °F (36.7 °C)   Resp 20   SpO2 95%      LABS: WBC 13.4 HCT 47.1     Assessment/Plan:      1.  Abdominal pain left lower quadrant  ?  Colitis  ?  Pancreatic head lesion  ?  Biliary infection  = Plan is to follow-up with GI/continue antibiotic/outpatient ERCP/EUS and biopsy.  Per GI recommendation     2 heroin use-counseled till her understanding to stop use of heroine      GI CONSULT-     A/P Suspected cholangitis due to cbd stricture on IV abx. No fever but still has high white count and poor appetite. ERCP will be needed for the cbd stricture. Chronic pancreatitis with calcification. Pancreatic head lesion. Diverticulosis.

## 2021-08-22 LAB
AMPHET UR QL SCN: NEGATIVE
BARBITURATES UR QL SCN: NEGATIVE
BENZODIAZ UR QL: NEGATIVE
BZE UR CFM-MCNC: 205 NG/ML
BZE UR QL: POSITIVE
CANNABINOIDS UR QL SCN: NEGATIVE
COCAINE UR QL SCN: POSITIVE
CODEINE UR CFM-MCNC: 476 NG/ML
CODEINE UR QL: POSITIVE
HDSCOM,HDSCOM: ABNORMAL
HYDROCODONE UR QL: NEGATIVE
HYDROMORPHONE UR QL: NEGATIVE
METHADONE UR QL: NEGATIVE
MORPHINE UR CFM-MCNC: >3000 NG/ML
MORPHINE UR QL: POSITIVE
OPIATES UR QL: POSITIVE
OPIATES UR QL: POSITIVE NG/ML
PCP UR QL: NEGATIVE

## 2021-08-23 NOTE — PROGRESS NOTES
Physician Progress Note      Juliet Prior  CSN #:                  [id number]  :                       1955  ADMIT DATE:       2021 10:06 AM  DISCH DATE:        8/15/2021 3:32 PM  RESPONDING  PROVIDER #:        Gaby FIGUEREDO MD          QUERY TEXT:     Dear Dr. Rowan Pinedo,    DS noted patient admitted with abdominal pain  and Ct suggestive of either biliary infection or. colitis with GI consultant diagnosis of  \"Possible cholangitis due to distal cbd stricture related to chronic pancreatitis\". Please clarify if you concur with GI for abdominal pain etiology or clarify etiology otherwise if:      The medical record reflects the following:  Risk Factors:  diverticulosis, chronic pancreatitis  Clinical Indicators:  8/15 DS:  Admitted with abdominal pain/CT suggestive of either biliary tract infection, colitis, also incidental finding of head of pancreas mass/lesion. GI consulted-advised antibiotic treatment for biliary infection  8/15 GI PN:  Possible cholangitis due to distal cbd stricture related to chronic pancreatitis- ERCP as out patient. Treatment:  GI consult, ceftriaxone 2g IV    Thank you,  KATIE Bolaños RN, [facility]  Office:  [phone]  Options provided:  -- Abdominal pain due to possible cholangitis due to distal cbd stricture related to chronic pancreatitis  -- Abdominal pain due to chronic pancreatitis vs colitis  -- Abdominal pain due to cholangitis vs colitis  -- Abdominal pain due to cbd stricture  -- Other - I will add my own diagnosis  -- Disagree - Not applicable / Not valid  -- Disagree - Clinically unable to determine / Unknown  -- Refer to Clinical Documentation Reviewer    PROVIDER RESPONSE TEXT:    This patient had abdominal pain due to cholangitis. Query created by:  Giuliana Eaton on 2021 11:25 AM      Electronically signed by:  Gaby Walters MD 2021 12:37 PM

## 2021-08-24 ENCOUNTER — APPOINTMENT (OUTPATIENT)
Dept: CT IMAGING | Age: 66
End: 2021-08-24
Attending: STUDENT IN AN ORGANIZED HEALTH CARE EDUCATION/TRAINING PROGRAM
Payer: MEDICARE

## 2021-08-24 ENCOUNTER — HOSPITAL ENCOUNTER (EMERGENCY)
Age: 66
Discharge: HOME | End: 2021-08-24
Attending: STUDENT IN AN ORGANIZED HEALTH CARE EDUCATION/TRAINING PROGRAM
Payer: MEDICARE

## 2021-08-24 ENCOUNTER — APPOINTMENT (OUTPATIENT)
Dept: GENERAL RADIOLOGY | Age: 66
End: 2021-08-24
Attending: PHYSICIAN ASSISTANT
Payer: MEDICARE

## 2021-08-24 VITALS
SYSTOLIC BLOOD PRESSURE: 108 MMHG | BODY MASS INDEX: 22.58 KG/M2 | HEIGHT: 60 IN | TEMPERATURE: 97.7 F | OXYGEN SATURATION: 95 % | HEART RATE: 56 BPM | RESPIRATION RATE: 16 BRPM | DIASTOLIC BLOOD PRESSURE: 57 MMHG | WEIGHT: 115 LBS

## 2021-08-24 DIAGNOSIS — R10.31 ABDOMINAL PAIN, RIGHT LOWER QUADRANT: Primary | ICD-10-CM

## 2021-08-24 LAB
ALBUMIN SERPL-MCNC: 4.4 G/DL (ref 3.4–5)
ALBUMIN/GLOB SERPL: 1 {RATIO} (ref 0.8–1.7)
ALP SERPL-CCNC: 54 U/L (ref 45–117)
ALT SERPL-CCNC: 36 U/L (ref 13–56)
ANION GAP SERPL CALC-SCNC: 3 MMOL/L (ref 3–18)
AST SERPL-CCNC: 24 U/L (ref 10–38)
ATRIAL RATE: 54 BPM
BASOPHILS # BLD: 0 K/UL (ref 0–0.1)
BASOPHILS NFR BLD: 0 % (ref 0–2)
BILIRUB DIRECT SERPL-MCNC: 0.1 MG/DL (ref 0–0.2)
BILIRUB SERPL-MCNC: 0.4 MG/DL (ref 0.2–1)
BUN SERPL-MCNC: 9 MG/DL (ref 7–18)
BUN/CREAT SERPL: 13 (ref 12–20)
CALCIUM SERPL-MCNC: 9.9 MG/DL (ref 8.5–10.1)
CALCULATED P AXIS, ECG09: 64 DEGREES
CALCULATED R AXIS, ECG10: 17 DEGREES
CALCULATED T AXIS, ECG11: 34 DEGREES
CANCER AG19-9 SERPL-ACNC: 7 U/ML (ref 0–35)
CHLORIDE SERPL-SCNC: 95 MMOL/L (ref 100–111)
CO2 SERPL-SCNC: 32 MMOL/L (ref 21–32)
CREAT SERPL-MCNC: 0.71 MG/DL (ref 0.6–1.3)
DIAGNOSIS, 93000: NORMAL
DIFFERENTIAL METHOD BLD: ABNORMAL
EOSINOPHIL # BLD: 0 K/UL (ref 0–0.4)
EOSINOPHIL NFR BLD: 0 % (ref 0–5)
ERYTHROCYTE [DISTWIDTH] IN BLOOD BY AUTOMATED COUNT: 12 % (ref 11.6–14.5)
GLOBULIN SER CALC-MCNC: 4.4 G/DL (ref 2–4)
GLUCOSE SERPL-MCNC: 112 MG/DL (ref 74–99)
HCT VFR BLD AUTO: 46.1 % (ref 35–45)
HGB BLD-MCNC: 15.5 G/DL (ref 12–16)
LIPASE SERPL-CCNC: 51 U/L (ref 73–393)
LYMPHOCYTES # BLD: 3.3 K/UL (ref 0.9–3.6)
LYMPHOCYTES NFR BLD: 35 % (ref 21–52)
MAGNESIUM SERPL-MCNC: 1.9 MG/DL (ref 1.6–2.6)
MCH RBC QN AUTO: 32.6 PG (ref 24–34)
MCHC RBC AUTO-ENTMCNC: 33.6 G/DL (ref 31–37)
MCV RBC AUTO: 96.8 FL (ref 78–100)
MONOCYTES # BLD: 0.5 K/UL (ref 0.05–1.2)
MONOCYTES NFR BLD: 5 % (ref 3–10)
NEUTS SEG # BLD: 5.6 K/UL (ref 1.8–8)
NEUTS SEG NFR BLD: 59 % (ref 40–73)
P-R INTERVAL, ECG05: 176 MS
PLATELET # BLD AUTO: 323 K/UL (ref 135–420)
PMV BLD AUTO: 9.6 FL (ref 9.2–11.8)
POTASSIUM SERPL-SCNC: 3.9 MMOL/L (ref 3.5–5.5)
PROT SERPL-MCNC: 8.8 G/DL (ref 6.4–8.2)
Q-T INTERVAL, ECG07: 460 MS
QRS DURATION, ECG06: 72 MS
QTC CALCULATION (BEZET), ECG08: 436 MS
RBC # BLD AUTO: 4.76 M/UL (ref 4.2–5.3)
SODIUM SERPL-SCNC: 130 MMOL/L (ref 136–145)
TROPONIN I SERPL-MCNC: <0.02 NG/ML (ref 0–0.04)
VENTRICULAR RATE, ECG03: 54 BPM
WBC # BLD AUTO: 9.4 K/UL (ref 4.6–13.2)

## 2021-08-24 PROCEDURE — 83690 ASSAY OF LIPASE: CPT

## 2021-08-24 PROCEDURE — 84484 ASSAY OF TROPONIN QUANT: CPT

## 2021-08-24 PROCEDURE — 85025 COMPLETE CBC W/AUTO DIFF WBC: CPT

## 2021-08-24 PROCEDURE — 83735 ASSAY OF MAGNESIUM: CPT

## 2021-08-24 PROCEDURE — 80053 COMPREHEN METABOLIC PANEL: CPT

## 2021-08-24 PROCEDURE — 82248 BILIRUBIN DIRECT: CPT

## 2021-08-24 PROCEDURE — 74177 CT ABD & PELVIS W/CONTRAST: CPT

## 2021-08-24 PROCEDURE — 99284 EMERGENCY DEPT VISIT MOD MDM: CPT

## 2021-08-24 PROCEDURE — 93005 ELECTROCARDIOGRAM TRACING: CPT

## 2021-08-24 PROCEDURE — 74011000636 HC RX REV CODE- 636: Performed by: STUDENT IN AN ORGANIZED HEALTH CARE EDUCATION/TRAINING PROGRAM

## 2021-08-24 PROCEDURE — 71046 X-RAY EXAM CHEST 2 VIEWS: CPT

## 2021-08-24 RX ORDER — POLYETHYLENE GLYCOL 3350 17 G/17G
17 POWDER, FOR SOLUTION ORAL
Qty: 254 G | Refills: 0 | Status: SHIPPED | OUTPATIENT
Start: 2021-08-24

## 2021-08-24 RX ORDER — DOCUSATE SODIUM 100 MG/1
100 CAPSULE, LIQUID FILLED ORAL 2 TIMES DAILY
Qty: 60 CAPSULE | Refills: 2 | Status: SHIPPED | OUTPATIENT
Start: 2021-08-24 | End: 2021-11-22

## 2021-08-24 RX ADMIN — IOPAMIDOL 90 ML: 612 INJECTION, SOLUTION INTRAVENOUS at 14:40

## 2021-08-24 NOTE — ED TRIAGE NOTES
Patient states discharged from here a week ago for same symptoms. States started again 3 days ago with abdominal pain, nausea, unable to keep anything down. States discharged with antibiotics for pancreas infection.

## 2021-08-24 NOTE — ED NOTES
I performed a brief evaluation, including history and physical, of the patient here in triage and I have determined that pt will need further treatment and evaluation from the main side ER physician. I have placed initial orders to help in expediting patients care.      August 24, 2021 at 11:03 AM - ESTER Mosqueda        Visit Vitals  /75   Pulse 62   Temp 98 °F (36.7 °C)   Resp 18   Ht 5' (1.524 m)   Wt 52.2 kg (115 lb)   SpO2 96%   BMI 22.46 kg/m²

## 2021-08-25 NOTE — ADT AUTH CERT NOTES
PREVIOUSLY DENIED FOR INPATIENT DOWNGRADED TO OBSERVATION REF # [id number]  DATES OF SERVICE 8/12-8/15      PLEASE FAX FORM OR CALL BACK TO NOTIFY IF  AUTHORIZATION FOR OBSERVATION IS OR IS NOT REQUIRED  PHONE # [phone] FAX # [phone]

## 2021-08-26 NOTE — ED PROVIDER NOTES
HPI   Patient is a 66-year-old female who presents with a 3-day history of right lower quadrant abdominal pain. She states that she was previously seen and hospitalized for this due to concerns for an infection. She has not yet followed up with outpatient GI or gotten an ERCP. States that the pain feels similar to previous. She does have associated nausea but is only had approximately 1 bout of emesis. States that over this time she has had a poor appetite. She feels the pain is an aching constant pain to her right lower quadrant she does not feel any pain anywhere else including her right upper quadrant. States that she is having regular bowel movements and denies any diarrhea, constipation or blood. She denies any dysuria, hematuria or increased urinary frequency. She denies any vaginal discharge or vaginal bleeding. She is postmenopausal.  Still has her uterus. She denies any fever, sweats or chills. She states that she has been taking her antibiotics. States she still has a couple days left of the 1. She does state that she has been intermittently using heroin. Denies any associated chest pain, difficulty breathing, runny nose or sore throat. Denies any sick contacts or Covid culture. Past Medical History:   Diagnosis Date    Heroin abuse ([facility] [address]     Hypertension        No past surgical history on file.       Family History:   Problem Relation Age of Onset    Cancer Mother     Heart Disease Father     Hypertension Sister        Social History     Socioeconomic History    Marital status: SINGLE     Spouse name: Not on file    Number of children: Not on file    Years of education: Not on file    Highest education level: Not on file   Occupational History    Not on file   Tobacco Use    Smoking status: Former Smoker     Packs/day: 0.30     Types: Cigarettes     Quit date: 3/26/2020     Years since quittin.4    Smokeless tobacco: Never Used   Substance and Sexual Activity    Alcohol use: Yes     Comment: occasional    Drug use: Not Currently     Types: Heroin     Comment: taking methadone stop Heroin about 8 years ago    Sexual activity: Not on file   Other Topics Concern    Not on file   Social History Narrative    Not on file     Social Determinants of Health     Financial Resource Strain:     Difficulty of Paying Living Expenses:    Food Insecurity:     Worried About Running Out of Food in the Last Year:     [address] in the Last Year:    Transportation Needs:     Lack of Transportation (Medical):  Lack of Transportation (Non-Medical):    Physical Activity:     Days of Exercise per Week:     Minutes of Exercise per Session:    Stress:     Feeling of Stress :    Social Connections:     Frequency of Communication with Friends and Family:     Frequency of Social Gatherings with Friends and Family:     Attends [employer] Services:     Active Member of Clubs or Organizations:     Attends Club or Organization Meetings:     Marital Status:    Intimate Partner Violence:     Fear of Current or Ex-Partner:     Emotionally Abused:     Physically Abused:     Sexually Abused: ALLERGIES: Patient has no known allergies.     Review of Systems  Constitutional: No fever  HENT: No ear pain  Eyes: No change in vision  Respiratory: No SOB  Cardio: No chest pain  GI: No blood in stool  : No hematuria  MSK: No back pain  Skin: No rashes  Neuro: No headache    Vitals:    08/24/21 1101 08/24/21 1131 08/24/21 1547   BP: 122/75 (!) 100/55 (!) 108/57   Pulse: 62 (!) 55 (!) 56   Resp: 18 16 16   Temp: 98 °F (36.7 °C) 99 °F (37.2 °C) 97.7 °F (36.5 °C)   SpO2: 96% 93% 95%   Weight: 52.2 kg (115 lb)     Height: 5' (1.524 m)              Physical Exam   General: No acute distress  Head: Normocephalic, atraumatic  Psych: Cooperative and alert  Eyes: No scleral icterus, normal conjunctiva  ENT: Moist oral mucosa  Neck: Supple  CV: Regular rate and rhythm, no pitting edema, palpable radial pulses  Pulm: Clear breath sounds bilaterally without any wheezing or rhonchi, normal respiratory rate  GI: Normal bowel sounds, soft, mild tenderness to the suprapubic/right lower quadrant region, no tenderness to the right upper quadrant, no rebound or guarding, negative Bran sign  MSK: Moves all four extremities  Skin: No rashes  Neuro: Alert and conversive    MDM   Patient is a 51-year-old female who presents with right lower quadrant abdominal pain and nausea. Of note patient was recently noted to have a dilated common bile duct and is post obtain outpatient ERCP however per my interpretation she does not have any actual right upper quadrant pain, no significant vomiting and her symptoms do not seem related to gallbladder pathology. She did have an incidental note of concerning findings of: Irritation and was sent home with management of diverticulitis. States has been taking her medication. My concern is if she failed outpatient management is having worsening diverticular disease since her symptoms are more right lower quadrant. I would like to obtain a CT to rule out appendicitis although I think this is unlikely. She does not have any vaginal symptoms and is postmenopausal I think this is most likely related to her ovaries or uterus. Less likely urinary however we will obtain a UA. CBC, CMP, lipase and troponin are all within normal limits. EKG performed at 1213 shows a bradycardic sinus rhythm at a rate of 54 bpm.  There is a normal axis, QRS is narrow. No specific ST elevations or depressions, R wave progression across pericardium is satisfactory. Overall appears stable to previous. No signs of ACS or arrhythmia. Chest x-ray shows no acute cardiopulmonary process    CT of the abdomen pelvis shows:  1. Unchanged biliary duct dilation without etiology on recent MR.  2.  Calcifications at the pancreas suggestive of chronic pancreatitis.   3.  Mild atherosclerosis, though there is multifocal regions of stenosis. 4.  Cholelithiasis. 5.  Moderate colonic stool burden. 6.  Hepatomegaly.     Although patient CT reading does have multiple findings. She is not having specific epigastric tenderness I think that this is less likely related to pancreatitis. Her bile duct is still dilated and therefore we did encourage her to follow-up with GI as an outpatient for further work-up and evaluation of this however I do not see any specific findings to point to her pain in her right lower quadrant. No signs of colitis or other acute process. She does have moderate colonic stool burden therefore will be discharged home with Colace and MiraLAX. We did also discuss with her the benefits of heroin cessation. While in the emergency department she denied of any episodes of vomiting. She is able to tolerate oral intake. Patient stable for discharge at this time. Patient is in agreement with the plan to be discharged at this time. All the patient's questions were answered. Patient was given written instructions on the diagnosis, and states understanding of the plan moving forward. We did discuss important signs and symptoms that should prompt quick return to the emergency department. Disposition: Patient was discharged home in stable condition.   They will follow up with GI    Prescriptions: Colace and MiraLAX    Diagnosis: Acute right lower quadrant abdominal pain, no diagnosis  Chronic dilation of common bile duct        Procedures

## 2021-08-31 ENCOUNTER — HOSPITAL ENCOUNTER (OUTPATIENT)
Dept: LAB | Age: 66
Discharge: HOME | End: 2021-08-31

## 2021-08-31 LAB — XX-LABCORP SPECIMEN COL,LCBCF: NORMAL

## 2021-08-31 PROCEDURE — 99001 SPECIMEN HANDLING PT-LAB: CPT

## 2022-03-18 PROBLEM — K85.90 PANCREATITIS: Status: ACTIVE | Noted: 2021-08-12

## 2022-06-30 ENCOUNTER — HOSPITAL ENCOUNTER (OUTPATIENT)
Dept: LAB | Age: 67
Discharge: HOME | End: 2022-06-30

## 2022-06-30 LAB — XX-LABCORP SPECIMEN COL,LCBCF: NORMAL

## 2022-06-30 PROCEDURE — 99001 SPECIMEN HANDLING PT-LAB: CPT

## 2022-11-10 ENCOUNTER — TRANSCRIBE ORDER (OUTPATIENT)
Dept: SCHEDULING | Age: 67
End: 2022-11-10

## 2022-11-10 DIAGNOSIS — Z12.31 VISIT FOR SCREENING MAMMOGRAM: Primary | ICD-10-CM

## 2022-11-23 NOTE — ED NOTES
Patient A/O x 4, ambulated to and from bathroom; steady gait noted. Patient continues to deny any new complaints. Will continue to monitor patient. Discharged

## 2023-01-31 DIAGNOSIS — Z12.31 VISIT FOR SCREENING MAMMOGRAM: Primary | ICD-10-CM

## 2023-02-04 DIAGNOSIS — Z12.31 VISIT FOR SCREENING MAMMOGRAM: Primary | ICD-10-CM

## 2023-06-02 ENCOUNTER — HOSPITAL ENCOUNTER (OUTPATIENT)
Facility: HOSPITAL | Age: 68
End: 2023-06-02
Payer: MEDICARE

## 2023-06-02 DIAGNOSIS — E55.9 AVITAMINOSIS D: ICD-10-CM

## 2023-06-02 DIAGNOSIS — Z13.31 SCREENING FOR DEPRESSION: ICD-10-CM

## 2023-06-02 DIAGNOSIS — E78.00 PURE HYPERCHOLESTEROLEMIA: ICD-10-CM

## 2023-06-02 DIAGNOSIS — I10 ESSENTIAL HYPERTENSION, MALIGNANT: ICD-10-CM

## 2023-06-02 LAB
25(OH)D3 SERPL-MCNC: 30.8 NG/ML (ref 30–100)
ANION GAP SERPL CALC-SCNC: 3 MMOL/L (ref 3–18)
BASOPHILS # BLD: 0 K/UL (ref 0–0.1)
BASOPHILS NFR BLD: 1 % (ref 0–2)
BUN SERPL-MCNC: 9 MG/DL (ref 7–18)
BUN/CREAT SERPL: 12 (ref 12–20)
CALCIUM SERPL-MCNC: 10 MG/DL (ref 8.5–10.1)
CHLORIDE SERPL-SCNC: 101 MMOL/L (ref 100–111)
CHOLEST SERPL-MCNC: 197 MG/DL
CO2 SERPL-SCNC: 35 MMOL/L (ref 21–32)
CREAT SERPL-MCNC: 0.78 MG/DL (ref 0.6–1.3)
DIFFERENTIAL METHOD BLD: ABNORMAL
EOSINOPHIL # BLD: 0.2 K/UL (ref 0–0.4)
EOSINOPHIL NFR BLD: 2 % (ref 0–5)
ERYTHROCYTE [DISTWIDTH] IN BLOOD BY AUTOMATED COUNT: 12.9 % (ref 11.6–14.5)
GLUCOSE SERPL-MCNC: 105 MG/DL (ref 74–99)
HCT VFR BLD AUTO: 50 % (ref 35–45)
HDLC SERPL-MCNC: 63 MG/DL (ref 40–60)
HDLC SERPL: 3.1 (ref 0–5)
HGB BLD-MCNC: 16.1 G/DL (ref 12–16)
IMM GRANULOCYTES # BLD AUTO: 0 K/UL (ref 0–0.04)
IMM GRANULOCYTES NFR BLD AUTO: 0 % (ref 0–0.5)
LDLC SERPL CALC-MCNC: 113.6 MG/DL (ref 0–100)
LIPID PANEL: ABNORMAL
LYMPHOCYTES # BLD: 3.1 K/UL (ref 0.9–3.6)
LYMPHOCYTES NFR BLD: 37 % (ref 21–52)
MCH RBC QN AUTO: 31.8 PG (ref 24–34)
MCHC RBC AUTO-ENTMCNC: 32.2 G/DL (ref 31–37)
MCV RBC AUTO: 98.8 FL (ref 78–100)
MONOCYTES # BLD: 0.6 K/UL (ref 0.05–1.2)
MONOCYTES NFR BLD: 7 % (ref 3–10)
NEUTS SEG # BLD: 4.3 K/UL (ref 1.8–8)
NEUTS SEG NFR BLD: 53 % (ref 40–73)
NRBC # BLD: 0 K/UL (ref 0–0.01)
NRBC BLD-RTO: 0 PER 100 WBC
PLATELET # BLD AUTO: 257 K/UL (ref 135–420)
PMV BLD AUTO: 10.1 FL (ref 9.2–11.8)
POTASSIUM SERPL-SCNC: 3.9 MMOL/L (ref 3.5–5.5)
RBC # BLD AUTO: 5.06 M/UL (ref 4.2–5.3)
SODIUM SERPL-SCNC: 139 MMOL/L (ref 136–145)
T4 FREE SERPL-MCNC: 1.1 NG/DL (ref 0.7–1.5)
TRIGL SERPL-MCNC: 102 MG/DL
TSH SERPL DL<=0.05 MIU/L-ACNC: 1.15 UIU/ML (ref 0.36–3.74)
VLDLC SERPL CALC-MCNC: 20.4 MG/DL
WBC # BLD AUTO: 8.2 K/UL (ref 4.6–13.2)

## 2023-06-02 PROCEDURE — 80048 BASIC METABOLIC PNL TOTAL CA: CPT

## 2023-06-02 PROCEDURE — 84439 ASSAY OF FREE THYROXINE: CPT

## 2023-06-02 PROCEDURE — 82306 VITAMIN D 25 HYDROXY: CPT

## 2023-06-02 PROCEDURE — 85025 COMPLETE CBC W/AUTO DIFF WBC: CPT

## 2023-06-02 PROCEDURE — 36415 COLL VENOUS BLD VENIPUNCTURE: CPT

## 2023-06-02 PROCEDURE — 80061 LIPID PANEL: CPT

## 2023-06-02 PROCEDURE — 84443 ASSAY THYROID STIM HORMONE: CPT

## 2025-08-06 ENCOUNTER — TRANSCRIBE ORDERS (OUTPATIENT)
Facility: HOSPITAL | Age: 70
End: 2025-08-06

## 2025-08-06 DIAGNOSIS — Z12.31 VISIT FOR SCREENING MAMMOGRAM: Primary | ICD-10-CM
